# Patient Record
Sex: FEMALE | Race: WHITE | NOT HISPANIC OR LATINO | Employment: FULL TIME | URBAN - METROPOLITAN AREA
[De-identification: names, ages, dates, MRNs, and addresses within clinical notes are randomized per-mention and may not be internally consistent; named-entity substitution may affect disease eponyms.]

---

## 2023-09-02 PROBLEM — L50.3 DERMATOGRAPHISM: Status: ACTIVE | Noted: 2023-09-02

## 2023-09-02 PROBLEM — H72.01 CENTRAL PERFORATION OF TYMPANIC MEMBRANE OF RIGHT EAR: Status: ACTIVE | Noted: 2023-09-02

## 2023-09-02 PROBLEM — R60.0 EDEMA, LEG: Status: ACTIVE | Noted: 2023-09-02

## 2023-09-02 PROBLEM — O10.919 CHRONIC HYPERTENSION IN PREGNANCY (HHS-HCC): Status: ACTIVE | Noted: 2023-09-02

## 2023-09-02 PROBLEM — F41.9 ANXIETY: Status: ACTIVE | Noted: 2023-09-02

## 2023-09-02 PROBLEM — R53.82 CHRONIC FATIGUE: Status: ACTIVE | Noted: 2023-09-02

## 2023-09-02 PROBLEM — E66.1 DRUG-INDUCED OBESITY: Status: ACTIVE | Noted: 2023-09-02

## 2023-09-02 PROBLEM — O09.529 ELDERLY MULTIGRAVIDA WITH ANTEPARTUM CONDITION OR COMPLICATION (HHS-HCC): Status: ACTIVE | Noted: 2023-09-02

## 2023-09-02 PROBLEM — N97.9 FEMALE INFERTILITY: Status: ACTIVE | Noted: 2023-09-02

## 2023-09-02 PROBLEM — J30.9 ALLERGIC RHINITIS: Status: ACTIVE | Noted: 2023-09-02

## 2023-09-02 PROBLEM — E28.2 PCOS (POLYCYSTIC OVARIAN SYNDROME): Status: ACTIVE | Noted: 2023-09-02

## 2023-09-02 PROBLEM — O35.BXX0 ABNORMAL FETAL ECHOCARDIOGRAM AFFECTING ANTEPARTUM CARE OF MOTHER (HHS-HCC): Status: ACTIVE | Noted: 2023-09-02

## 2023-09-02 PROBLEM — O09.513 PRIMIGRAVIDA OF ADVANCED MATERNAL AGE IN THIRD TRIMESTER (HHS-HCC): Status: ACTIVE | Noted: 2023-09-02

## 2023-09-02 PROBLEM — R94.128 ABNORMAL TYMPANOGRAM: Status: ACTIVE | Noted: 2023-09-02

## 2023-09-02 PROBLEM — I10 BENIGN ESSENTIAL HYPERTENSION: Status: ACTIVE | Noted: 2023-09-02

## 2023-09-02 PROBLEM — H90.71 MIXED CONDUCTIVE AND SENSORINEURAL HEARING LOSS OF RIGHT EAR WITH UNRESTRICTED HEARING OF LEFT EAR: Status: ACTIVE | Noted: 2023-09-02

## 2023-09-02 PROBLEM — T78.2XXA ANAPHYLAXIS: Status: ACTIVE | Noted: 2023-09-02

## 2023-09-02 PROBLEM — Z86.39 HISTORY OF VITAMIN D DEFICIENCY: Status: ACTIVE | Noted: 2023-09-02

## 2023-09-02 PROBLEM — H91.90 HEARING LOSS: Status: ACTIVE | Noted: 2023-09-02

## 2023-09-02 PROBLEM — H65.04 RECURRENT ACUTE SEROUS OTITIS MEDIA OF RIGHT EAR: Status: ACTIVE | Noted: 2023-09-02

## 2023-09-02 PROBLEM — O09.43 HIGH RISK MULTIGRAVIDA IN THIRD TRIMESTER (HHS-HCC): Status: ACTIVE | Noted: 2023-09-02

## 2023-09-02 PROBLEM — O92.70 LACTATION DISORDER (HHS-HCC): Status: ACTIVE | Noted: 2023-09-02

## 2023-09-02 PROBLEM — Q37.9 CLEFT LIP AND PALATE (HHS-HCC): Status: ACTIVE | Noted: 2023-09-02

## 2023-09-02 RX ORDER — NORETHINDRONE 0.35 MG/1
1 TABLET ORAL DAILY
COMMUNITY
Start: 2022-12-15 | End: 2023-12-07 | Stop reason: ALTCHOICE

## 2023-09-02 RX ORDER — DOCUSATE SODIUM 100 MG/1
100 CAPSULE, LIQUID FILLED ORAL 2 TIMES DAILY PRN
COMMUNITY
Start: 2022-11-15 | End: 2023-12-07 | Stop reason: ALTCHOICE

## 2023-09-02 RX ORDER — ASPIRIN 81 MG/1
2 TABLET ORAL DAILY
COMMUNITY
Start: 2022-04-28 | End: 2023-12-07 | Stop reason: ALTCHOICE

## 2023-09-02 RX ORDER — DESOGESTREL AND ETHINYL ESTRADIOL 0.15-0.03
KIT ORAL EVERY 24 HOURS
COMMUNITY
Start: 2019-05-03 | End: 2024-03-01 | Stop reason: WASHOUT

## 2023-09-02 RX ORDER — LABETALOL 200 MG/1
1 TABLET, FILM COATED ORAL EVERY 12 HOURS
COMMUNITY
Start: 2020-02-19 | End: 2024-01-17 | Stop reason: SDUPTHER

## 2023-09-02 RX ORDER — GUAIFENESIN 1200 MG
975 TABLET, EXTENDED RELEASE 12 HR ORAL EVERY 6 HOURS PRN
COMMUNITY
Start: 2022-11-15 | End: 2023-12-07 | Stop reason: ALTCHOICE

## 2023-09-02 RX ORDER — IBUPROFEN 600 MG/1
600 TABLET ORAL EVERY 6 HOURS PRN
COMMUNITY
Start: 2022-11-15 | End: 2023-10-09

## 2023-09-02 RX ORDER — NYSTATIN 100000 U/G
1 CREAM TOPICAL 2 TIMES DAILY
COMMUNITY
Start: 2022-11-28 | End: 2023-12-07 | Stop reason: ALTCHOICE

## 2023-09-02 RX ORDER — POLYETHYLENE GLYCOL 3350 17 G/17G
1 POWDER, FOR SOLUTION ORAL DAILY
COMMUNITY
Start: 2023-01-03 | End: 2023-12-07 | Stop reason: ALTCHOICE

## 2023-10-05 ENCOUNTER — TELEMEDICINE (OUTPATIENT)
Dept: BEHAVIORAL HEALTH | Facility: CLINIC | Age: 38
End: 2023-10-05
Payer: COMMERCIAL

## 2023-10-05 DIAGNOSIS — F41.9 ANXIETY: ICD-10-CM

## 2023-10-05 PROCEDURE — 90837 PSYTX W PT 60 MINUTES: CPT | Performed by: PSYCHOLOGIST

## 2023-10-06 NOTE — PROGRESS NOTES
Behavioral Medicine Psychotherapy Progress Note      Name: Melanie Walsh  MRN: [id number]   YOB: 1985    Start Time: 1:05 pm   End Time: 2 pm   CPT Code: 81398 - 60 Min. with patient and/or family member        This is a follow-up virtual video visit.     Both the patient, and family and caregivers and guardians as appropriate, were informed of the current need to conduct treatment via virtual video visit. I have confirmed the patient's identity via the following (minimum of three) acceptable identifiers as per  Policy PH-9: name, birthdate, street address, and SSN.    Telephone/Televideo Informed Consent for Psychotherapy was reviewed with the patient as follows:  There are potential benefits and risks of the use of telephone or video-conferencing that differ from in-person sessions. Specifically, the telephone or televideo system we are using may not be HIPPA compliant and may present limits to patient confidentiality. Confidentiality still applies for telepsychology services, and nobody will record the session without your permission. You agree to use the telephone or video-conferencing platform selected for our virtual sessions, and I will explain how to use it.    1) You need to use a webcam or smartphone during the session.  2) It is important that you be in a quiet, private space that is free of distractions (including cell phone or other devices) during the session.  3) It is important to use a secure internet connection rather than public/free Wi-Fi.  4) It is important to be on time. If you need to cancel or change your tele-appointment, you must notify the psychologist in advance by phone or email.  5) We need a back-up plan (e.g., phone number where you can be reached) to restart the session or to reschedule it, in the event of technical problems.  6) We need a safety plan that includes at least one emergency contact and the closest emergency room to your location, in the event of a  crisis situation.  7) If you are not an adult, we need the permission of your parent or legal guardian (and their contact information) for you to participate in telepsychology sessions.    Understanding and verbal agreement was attested to by the patient.    Provider reviewed purpose of appointment and limits to confidentiality as indicated. Patient stated an understanding of the information and agreed to the session.       IDENTIFYING AND REFERRAL INFORMATION: VERONICA Walsh is a 37 yo able-bodied,  white female in postpartum period referred for treatment of anxiety symptoms.     SESSION: Today was the 11th appointment with this writer.       PRESENTING PROBLEM/SYMPTOMS/CHIEF COMPLAINT:   Patient said she has heightened anxiety postpartum since having her first child, in context of previous pregnancy loss and anxiety during the pregnancy. Worries were focused on potential for more health problems Worries can affect sleep, will go and check on her baby regularly before going to sleep. As she's gotten further into the postpartum period it is evident that her anxiety symptoms are more longstanding and present prior to her pregnancy.     Mental Status:  Orientation and consciousness: [x ]oriented X 3 and attentive     [ ]other, explain:   Appearance/grooming (factors observable via video):    [x ]appropriate [ ]poor grooming/hygiene bizarre appearance    [ ]other, explain:    Behavior: [x ]appropriate to context   [ ]inappropriate and/or bizarre, explain:    Speech: [x]normal rate/rhythm [ ]pressured speech []slow    [ ]other,    Language: [x ]normal    [ ]abnormalities noted, explain:    Mood: [ ]euthymic [ x]depressed [ ]dysphoric [ ]anxious    [ ]euphoric [ ] other   Affect: [x]mood congruent    []restricted   []incongruent, explain:    Evidence of perceptual disturbances (hallucinations, illusions, etc.):    [ x]no    [ ]yes, explain:   Thought processes:     [x ]normal and congruent     [ ]other, explain:     Insight: [ ]good [x ]adequate [ ]poor []questionable   Judgment: [ ]good [x ]adequate [ ]poor []questionable   Fund of Knowledge:[ ]above average [x ]average [ ]below average    Suicidal/Homicidal assessment: No lethality issues noted or observed. Patient denied suicidal or homicidal ideation, intent, or plan. Risk of harm low at this time. Has significant stressors and active mental health concerns, but several protective factors including help seeking, future orientation, reasons for living, responsibility to family, no substance abuse, and no history of self-harm or SI.      CONTENT:  Patient said she continues to feel increased stress and anxiety still, with all the steps of selling/buying houses and moving. They are in her parents' house and in the move in phase for their new house, which for now involves cleaning the house, painting, and doing minor repairs before living there full time. She said she's experienced a significant sense of calm and feeling of ownership that she did not experience with their first house; she knew that one first as her grandmother's house and no matter how much they updated it still felt like her grandmother's house. She likes feeling as if this is hers. She also had insight into her parents' lack of understanding about why she and her partner wanted this house; it is a fixer upper cosmetically which is not the phase of life her parents are at. Patient could understand why they are taking that perspective, which she noted is helpful insight. She also said it's been going “pretty well” staying at her parents' house. She said her father is starting to understand patient's daughter (his granddaughter) uses crying to communicate, not to annoy people. She said he's getting more patient with it. She also noticed communication with her dad has improved by them being around each other more and through her effort to address communication. She said she's realizing he's more receptive to new  information than she thought, particularly on certain topics like her parenting choices. She noted she did use deep breathing/controlled breathing, taking the dog for a walk to get a break and be outside have been helpful stress management tools. She finds these the most familiar and most accessible to use. “They've been very helpful.” The daytime routine is still upended, though, because of staying at her parents' and extra time in the day at the new house. She's noticed “even something as simple as showering” has been difficult because it's harder to prioritize the time. She is also aware there will be several adjustments once in their new house because it is (purposely) smaller than their previous house; this will impact her sewing/crafting space and she's mentally prepared and imagined options for sewing and making it mobile. She noted she would prefer to spend time on herself after her daughter goes to bed, but she's exhausted by then. She's by herself during the day with her daughter which makes it difficult to do activities any other time. Her mother will watch her daughter periodically, usually when she runs errands or goes to work (Thursdays).     She described familiarity with yoga nidra and interest in returning to practice, as another resource. She is hopeful to start going to a studio within walking distance of her new house; her BALBIR lives close by and she is also hoping they hold each other accountable because she wants to start going to yoga again as well. Discussed potential for doing so and strategies for setting realistic expectations. Also spent time during session normalizing her experience in that some situations are more challenging and can prompt more unwanted emotions and more uncomfortable situations; emphasized that in these circumstances regular use of stress management tools is important. Also discussed experiences with approaching/acting on situations that are anxiety-provoking and how  "it can over time help in learning to manage the anxiety. She felt like this was consistent with her experience with her dad; she was skeptical they could improve the dynamic in their relationship but she feels like the time together in the same house has helped them do just that.     Will explore what makes it difficult to prioritize her personal time. 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 Financially it was also the only thing feasible for them. She does not think she feels guilty about stepping away from her daughter to do her own thing, but wants to explore how she feels in making decisions for herself. She assumes guilt will show up in the future as she does try to do more things for herself or on her own and she wants to understand this more. Provider suggested exploring how she has felt so far in these situations and what contributes to her decisions, to see if we can gain insight into where the barriers lie.       FROM PREVIOUS SESSION:   Patient also mentioned she sees her father's disagreement with her as a negative, that she \"shouldn't upset him. I'm not supposed to upset other people especially my parents.\" Provider introduced tyranny of the shoulds and noted that should statements are an expression of personal expectations. Suggested when she notices a should statement she can reflect on the expectation she expressed to decide if it is still realistic for her. She said that would be \"very empowering\" to realize she can create her own experience, that she has the power to change these things. Provider acknowledged it can be challenging and she may not know how she wants to change her expectations, but she can start with identifying which expectations she wants to change. Provider also noted that she seems to have self-critical automatic thoughts about her father's negative reactions to her and maybe her expectations are prompting that self-criticism, since she expects " herself to only please others not make them angry/disappointed. She agreed.       THERAPEUTIC INTERVENTION:  [ x] Psychosocial  [ ] Treatment Goals  [ x] Cognitive/Behavioral   [ x] Psychoeducation   [ ] Insight/Psychodynamic  [ ] Problem solving  [ x] Supportive  [ ] Referral to additional/other treatment/resources    Patient Discussion/Summary  rt for individual psychotherapy with this psychologist via video telehealth. Patient agreed to appointment frequency and plan. Patient has scheduling contact info and plans to call for more appointments.

## 2023-10-09 ENCOUNTER — OFFICE VISIT (OUTPATIENT)
Dept: GASTROENTEROLOGY | Facility: CLINIC | Age: 38
End: 2023-10-09
Payer: COMMERCIAL

## 2023-10-09 VITALS — BODY MASS INDEX: 44.26 KG/M2 | HEIGHT: 67 IN | WEIGHT: 282 LBS | HEART RATE: 84 BPM

## 2023-10-09 DIAGNOSIS — Q37.9 CLEFT LIP AND PALATE (HHS-HCC): ICD-10-CM

## 2023-10-09 DIAGNOSIS — R10.13 EPIGASTRIC PAIN: ICD-10-CM

## 2023-10-09 DIAGNOSIS — K21.9 GASTROESOPHAGEAL REFLUX DISEASE, UNSPECIFIED WHETHER ESOPHAGITIS PRESENT: Primary | ICD-10-CM

## 2023-10-09 DIAGNOSIS — R11.11 VOMITING WITHOUT NAUSEA, UNSPECIFIED VOMITING TYPE: ICD-10-CM

## 2023-10-09 PROCEDURE — 99204 OFFICE O/P NEW MOD 45 MIN: CPT

## 2023-10-09 RX ORDER — PANTOPRAZOLE SODIUM 40 MG/1
40 TABLET, DELAYED RELEASE ORAL DAILY
Qty: 30 TABLET | Refills: 2 | Status: SHIPPED | OUTPATIENT
Start: 2023-10-09 | End: 2023-12-07 | Stop reason: SDUPTHER

## 2023-10-09 ASSESSMENT — ENCOUNTER SYMPTOMS
VOMITING: 0
DEPRESSION: 0
ANAL BLEEDING: 0
FATIGUE: 0
LOSS OF SENSATION IN FEET: 0
SHORTNESS OF BREATH: 0
COUGH: 0
BLOOD IN STOOL: 0
ABDOMINAL DISTENTION: 0
TROUBLE SWALLOWING: 0
RECTAL PAIN: 0
FEVER: 0
APPETITE CHANGE: 0
ABDOMINAL PAIN: 0
CONSTIPATION: 0
DIARRHEA: 0
CHILLS: 0
NAUSEA: 0
OCCASIONAL FEELINGS OF UNSTEADINESS: 0

## 2023-10-09 NOTE — PROGRESS NOTES
Subjective     History of Present Illness:   Melanie Walsh is a 38 y.o. female with PMHx of anxiety, HTN, PCOS who presents to GI clinic for further evaluation severe epigastric pain    Today, patient states states when she has an allergic reaction for the past 2 yrs, she experiences intense gut pain and heartburn.  So severe, she vomits.  Unidentified reaction at this time.  Pepcid in the ED helped.  Epigastric pain lasts a few days, swelling in throat, wheezing, and congestion after anaphylactic reaction. Hasn't tried any medications regularly.  Patient states she is currently breast-feeding.  Is following with allergist to work on identifiable cause, but seems to have related to seasonal or environmental allergies.  Recently moved from 100-year-old house to new her house, which she has not experienced symptoms while living in.  Denies NSAID use.  Denies constipation, diarrhea, melena, hematochezia, dysphagia, unintentional weight loss    Denies ETOH, smoking, marijuana  Denies fxh GI cancer or IBD  Abdominal Surgeries: , cough palate repair    Denies history of colonoscopy or EGD      Past Medical History  As per HPI.     Social History  As per HPI reports that she has never smoked. She has never used smokeless tobacco. She reports that she does not currently use alcohol. She reports that she does not use drugs.     Family History  her family history includes Diabetes in her father and mother; Osteoarthritis in her mother.     Review of Systems  Review of Systems   Constitutional:  Negative for appetite change, chills, fatigue and fever.   HENT:  Negative for trouble swallowing.    Respiratory:  Negative for cough and shortness of breath.    Gastrointestinal:  Negative for abdominal distention, abdominal pain, anal bleeding, blood in stool, constipation, diarrhea, nausea, rectal pain and vomiting.       Allergies  Allergies   Allergen Reactions    Seasonale (91) [Levonorgestrel-Ethinyl Estrad]  Anaphylaxis    Penicillin V Hives       Medications  Current Outpatient Medications   Medication Instructions    acetaminophen (TYLENOL) 975 mg, oral, Every 6 hours PRN    aspirin 81 mg EC tablet 2 tablets, oral, Daily    desogestreL-ethinyl estradioL (Apri) 0.15-0.03 mg tablet Every 24 hours    docusate sodium (COLACE) 100 mg, oral, 2 times daily PRN    labetalol (Normodyne) 200 mg tablet 1 tablet, oral, Every 12 hours    loratadine-pseudoephedrine (Claritin-D 24-hour)  mg 24 hr tablet 1 tablet, oral, Daily, Do not crush, chew, or split.    norethindrone (Lyleq) 0.35 mg tablet 1 tablet, oral, Daily    nystatin (Mycostatin) cream 1 Film, Topical, 2 times daily    pantoprazole (PROTONIX) 40 mg, oral, Daily, Do not crush, chew, or split. Take 30 minutes before a meal    -iron fum-folic acid 29 mg iron- 1 mg tablet oral    polyethylene glycol (Glycolax, Miralax) 17 gram/dose powder 1 packet, oral, Daily,  MIX 1 PACKET IN 8 OUNCES OF LIQUID AND DRINK ONCE DAILY.<BR>        Objective   Visit Vitals  Pulse 84      Physical Exam  Constitutional:       Appearance: Normal appearance. She is normal weight.   HENT:      Mouth/Throat:      Mouth: Mucous membranes are dry.      Pharynx: Oropharynx is clear.   Cardiovascular:      Rate and Rhythm: Normal rate and regular rhythm.   Pulmonary:      Effort: Pulmonary effort is normal.      Breath sounds: Normal breath sounds. No wheezing or rhonchi.   Abdominal:      General: Abdomen is flat. Bowel sounds are normal. There is no distension.      Palpations: Abdomen is soft. There is no hepatomegaly.      Tenderness: There is no abdominal tenderness. There is no guarding or rebound. Negative signs include Crystal's sign.      Hernia: No hernia is present.   Musculoskeletal:         General: Normal range of motion.   Skin:     General: Skin is warm and dry.   Neurological:      General: No focal deficit present.      Mental Status: She is alert and oriented to person,  place, and time.   Psychiatric:         Mood and Affect: Mood normal.         Behavior: Behavior normal.           Lab Results   Component Value Date    WBC 7.7 01/03/2023    WBC 10.7 11/13/2022    WBC 16.8 (H) 11/12/2022    HGB 13.8 01/03/2023    HGB 9.2 (L) 11/13/2022    HGB 10.8 (L) 11/12/2022    HCT 43.4 01/03/2023    HCT 28.2 (L) 11/13/2022    HCT 34.5 (L) 11/12/2022     (H) 01/03/2023     11/13/2022     11/12/2022     Lab Results   Component Value Date     01/03/2023     04/28/2022     03/16/2022    K 4.7 01/03/2023    K 4.1 04/28/2022    K 3.8 03/16/2022     01/03/2023     04/28/2022     03/16/2022    CO2 26 01/03/2023    CO2 23 04/28/2022    CO2 22 03/16/2022    BUN 17 01/03/2023    BUN 9 04/28/2022    BUN 18 03/16/2022    CREATININE 0.73 01/03/2023    CREATININE 0.49 (L) 04/28/2022    CREATININE 0.58 03/16/2022    CALCIUM 9.9 01/03/2023    CALCIUM 9.9 04/28/2022    CALCIUM 9.6 03/16/2022    PROT 6.7 01/03/2023    PROT 6.5 04/28/2022    PROT 7.1 03/16/2022    BILITOT 0.4 01/03/2023    BILITOT 0.4 04/28/2022    BILITOT 0.4 03/16/2022    ALKPHOS 104 01/03/2023    ALKPHOS 56 04/28/2022    ALKPHOS 77 03/16/2022    ALT 28 01/03/2023    ALT 21 04/28/2022    ALT 17 03/16/2022    AST 17 01/03/2023    AST 14 04/28/2022    AST 14 03/16/2022    GLUCOSE 86 01/03/2023    GLUCOSE 85 04/28/2022    GLUCOSE 80 03/16/2022           Melanie Walsh is a 38 y.o. female who presents to GI clinic for GERD which seems to relate to allergic anaphylactic reactions of an identifiable cause.  Consider gastritis, EOE, esophagitis, duodenitis, PUD  -Schedule EGD  -Start Protonix 40 mg daily  -Continue to follow with allergist  -Follow-up 3 weeks postprocedure           GISELA Gilman-CNP    Time Spent  Time spent directly with patient, family or caregiver: 55 minutes  Documentation Time: 15 minutes

## 2023-10-09 NOTE — PATIENT INSTRUCTIONS
Please take Protonix 40 mg daily.  Please schedule your upper endoscopy.  Plan to have a ride for this procedure since it involves sedation.  Please do not eat or drink anything after midnight the night prior to this procedure.   Please follow up 3 weeks post procedure

## 2023-10-20 ENCOUNTER — TELEMEDICINE (OUTPATIENT)
Dept: BEHAVIORAL HEALTH | Facility: CLINIC | Age: 38
End: 2023-10-20
Payer: COMMERCIAL

## 2023-10-20 DIAGNOSIS — F41.9 ANXIETY: Primary | ICD-10-CM

## 2023-10-20 PROCEDURE — 90837 PSYTX W PT 60 MINUTES: CPT | Mod: 95 | Performed by: PSYCHOLOGIST

## 2023-10-20 NOTE — PROGRESS NOTES
Behavioral Medicine Psychotherapy Progress Note      Name: Melanie Walsh    Start Time: 1 pm   End Time: 1:56 pm   Time with patient: 56 minutes    SESSION: Today was the 12th appointment with this writer.     This is a follow-up virtual video visit.     Both the patient, and family and caregivers and guardians as appropriate, were informed of the current need to conduct treatment via virtual video visit. I have confirmed the patient's identity via the following (minimum of three) acceptable identifiers as per  Policy PH-9: name, birthdate, street address, and SSN.    Telephone/Televideo Informed Consent for Psychotherapy was reviewed with the patient as follows:  There are potential benefits and risks of the use of telephone or video-conferencing that differ from in-person sessions. Specifically, the telephone or televideo system we are using may not be HIPPA compliant and may present limits to patient confidentiality. Confidentiality still applies for telepsychology services, and nobody will record the session without your permission. You agree to use the telephone or video-conferencing platform selected for our virtual sessions, and I will explain how to use it.    1) You need to use a webcam or smartphone during the session.  2) It is important that you be in a quiet, private space that is free of distractions (including cell phone or other devices) during the session.  3) It is important to use a secure internet connection rather than public/free Wi-Fi.  4) It is important to be on time. If you need to cancel or change your tele-appointment, you must notify the psychologist in advance by phone or email.  5) We need a back-up plan (e.g., phone number where you can be reached) to restart the session or to reschedule it, in the event of technical problems.  6) We need a safety plan that includes at least one emergency contact and the closest emergency room to your location, in the event of a crisis  situation.  7) If you are not an adult, we need the permission of your parent or legal guardian (and their contact information) for you to participate in telepsychology sessions.    Understanding and verbal agreement was attested to by the patient.    Provider reviewed purpose of appointment and limits to confidentiality as indicated. Patient stated an understanding of the information and agreed to the session.       IDENTIFYING INFORMATION: VERONICA Walsh is a 37 yo able-bodied,  white female in postpartum period referred for treatment of anxiety symptoms.       PRESENTING PROBLEM/SYMPTOMS/CHIEF COMPLAINT:   Patient said she has heightened anxiety postpartum since having her first child, in context of previous pregnancy loss and anxiety during the pregnancy. Worries were focused on potential for more health problems. As she's gotten further into the postpartum period it is evident that her anxiety symptoms are more longstanding and present prior to her pregnancy. Addressing postpartum anxiety and general anxiety concerns.     Mental Status:  Orientation and consciousness: [x ]oriented X 3 and attentive     [ ]other, explain:   Appearance/grooming (factors observable via video):    [x ]appropriate [ ]poor grooming/hygiene bizarre appearance    [ ]other, explain:    Behavior: [x ]appropriate to context   [ ]inappropriate and/or bizarre, explain:    Speech: [x]normal rate/rhythm [ ]pressured speech []slow    [ ]other,    Language: [x ]normal    [ ]abnormalities noted, explain:    Mood: [ x]euthymic [ ]depressed [ ]dysphoric [ ]anxious    [ ]euphoric [ ] other   Affect: [x]mood congruent    []restricted   []incongruent, explain:    Evidence of perceptual disturbances (hallucinations, illusions, etc.):    [ x]no    [ ]yes, explain:   Thought processes:     [x ]normal and congruent     [ ]other, explain:    Insight: [ ]good [x ]adequate [ ]poor []questionable   Judgment: [ ]good [x ]adequate [ ]poor []questionable    "Fund of Knowledge:[ ]above average [x ]average [ ]below average    Suicidal/Homicidal assessment: No lethality issues noted or observed. Patient denied suicidal or homicidal ideation, intent, or plan. Risk of harm low at this time.       CONTENT:  Patient said she continues to feel increased stress and anxiety still, with all the steps of moving. Their move-in date got pushed back because the updates/cleaning for their new house is taking longer than anticipated. Patient said they are ready to move because their space in her parents' house is feeling confining. Patient recognized the unwanted elements of the situation as well as what she appreciates (\"at least we have somewhere to stay that's free and flexible\"). She indicated it is helpful to choose to focus on what she appreciates instead of what's not going well.     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 This is driven, in part, by figuring out how to include personal time in the routine/schedule. She noted she would prefer to spend time on herself after her daughter goes to bed, but she's exhausted by then. If she stays up she is in bed by 9:45. She's by herself during the day and her daughter rarely naps. Her mother will watch her daughter periodically, but she does not want to feel like her mother is pressured to watch her daughter frequently. She does not think she feels guilty about stepping away from her daughter to do her own thing, but wants to explore how she feels in making decisions for herself. She assumes guilt will show up in the future as she does try to do more things for herself or on her own and she wants to understand this more.     Stress management tools: deep breathing particularly when on a walk, being outside in nature. Hoping in future to go to yoga class with her BALBIR at a neighborhood studio.     FROM PREVIOUS SESSION:   Patient also " "mentioned she sees her father's disagreement with her as a negative, that she \"shouldn't upset him. I'm not supposed to upset other people especially my parents.\" She said that would be \"very empowering\" to realize she can create her own experience, that she has the power to change these things. Provider also noted that she seems to have self-critical automatic thoughts about her father's negative reactions to her and maybe her expectations are prompting that self-criticism, since she expects herself to only please others not make them angry/disappointed. She agreed.   She said she's experienced a significant sense of calm and feeling of ownership that she did not experience with their first house. She likes feeling as if this is hers. She also said it's been going “pretty well” staying at her parents' house. She noticed communication with her dad has improved by them being around each other more and through her effort to address communication. She said she's realizing he's more receptive to new information than she thought, particularly on certain topics like her parenting choices.       THERAPEUTIC INTERVENTION:  [ x] Psychosocial  [ ] Treatment Goals  [ x] Cognitive/Behavioral   [ x] Psychoeducation   [ ] Insight/Psychodynamic  [ ] Problem solving  [ x] Supportive  [ ] Referral to additional/other treatment/resources    Plan:   rtc for individual psychotherapy with this psychologist via video telehealth. Patient agreed to appointment frequency and plan. Patient has scheduling contact info and plans to call for more appointments.  "

## 2023-10-24 ENCOUNTER — APPOINTMENT (OUTPATIENT)
Dept: OTOLARYNGOLOGY | Facility: CLINIC | Age: 38
End: 2023-10-24
Payer: COMMERCIAL

## 2023-11-02 ENCOUNTER — APPOINTMENT (OUTPATIENT)
Dept: BEHAVIORAL HEALTH | Facility: CLINIC | Age: 38
End: 2023-11-02
Payer: COMMERCIAL

## 2023-11-17 ENCOUNTER — OFFICE VISIT (OUTPATIENT)
Dept: GASTROENTEROLOGY | Facility: EXTERNAL LOCATION | Age: 38
End: 2023-11-17
Payer: COMMERCIAL

## 2023-11-17 DIAGNOSIS — K31.89 GASTROPTOSIS: ICD-10-CM

## 2023-11-17 DIAGNOSIS — K21.9 GASTROESOPHAGEAL REFLUX DISEASE, UNSPECIFIED WHETHER ESOPHAGITIS PRESENT: ICD-10-CM

## 2023-11-17 DIAGNOSIS — R11.2 NAUSEA AND VOMITING, UNSPECIFIED VOMITING TYPE: Primary | ICD-10-CM

## 2023-11-17 PROCEDURE — 88305 TISSUE EXAM BY PATHOLOGIST: CPT | Performed by: PATHOLOGY

## 2023-11-17 PROCEDURE — 43239 EGD BIOPSY SINGLE/MULTIPLE: CPT | Performed by: INTERNAL MEDICINE

## 2023-11-17 PROCEDURE — 88305 TISSUE EXAM BY PATHOLOGIST: CPT

## 2023-11-20 ENCOUNTER — LAB REQUISITION (OUTPATIENT)
Dept: LAB | Facility: HOSPITAL | Age: 38
End: 2023-11-20
Payer: COMMERCIAL

## 2023-12-04 LAB
LABORATORY COMMENT REPORT: NORMAL
PATH REPORT.FINAL DX SPEC: NORMAL
PATH REPORT.GROSS SPEC: NORMAL
PATH REPORT.RELEVANT HX SPEC: NORMAL
PATH REPORT.TOTAL CANCER: NORMAL

## 2023-12-07 ENCOUNTER — OFFICE VISIT (OUTPATIENT)
Dept: GASTROENTEROLOGY | Facility: CLINIC | Age: 38
End: 2023-12-07
Payer: COMMERCIAL

## 2023-12-07 VITALS — BODY MASS INDEX: 43 KG/M2 | WEIGHT: 274 LBS | HEIGHT: 67 IN | OXYGEN SATURATION: 98 % | HEART RATE: 80 BPM

## 2023-12-07 DIAGNOSIS — K21.9 GASTROESOPHAGEAL REFLUX DISEASE, UNSPECIFIED WHETHER ESOPHAGITIS PRESENT: ICD-10-CM

## 2023-12-07 PROCEDURE — 1036F TOBACCO NON-USER: CPT

## 2023-12-07 PROCEDURE — 99213 OFFICE O/P EST LOW 20 MIN: CPT

## 2023-12-07 RX ORDER — PANTOPRAZOLE SODIUM 40 MG/1
40 TABLET, DELAYED RELEASE ORAL DAILY
Qty: 30 TABLET | Refills: 4 | Status: SHIPPED | OUTPATIENT
Start: 2023-12-07 | End: 2024-05-13

## 2023-12-07 ASSESSMENT — ENCOUNTER SYMPTOMS
CONSTIPATION: 0
FEVER: 0
CHILLS: 0
SHORTNESS OF BREATH: 0
BLOOD IN STOOL: 0
FATIGUE: 0
NAUSEA: 0
TROUBLE SWALLOWING: 0
DIARRHEA: 0
ABDOMINAL DISTENTION: 0
COUGH: 0
VOMITING: 0
ANAL BLEEDING: 0
ABDOMINAL PAIN: 0
RECTAL PAIN: 0
APPETITE CHANGE: 0

## 2023-12-07 NOTE — PROGRESS NOTES
Subjective     History of Present Illness:   Melanie Walsh is a 38 y.o. female with PMHx of anxiety, HTN, PCOS   who presents to GI clinic for follow up.  Last seen around 10/2023 for GERD and patient complains of allergies.  EGD ordered, Protonix.    Today, patient states she is doing much better on 40 mg Protonix.  Sleeps on a wedge pillow, which helps.  Had a couple flareups of acid reflux around Thanksgiving when eating food she normally does not eat, other than that is controlled.  Denies any other GI complaints today    Denies ETOH, smoking, marijuana  Denies fxh GI cancer or IBD  Abdominal Surgeries: , cough palate repair     Denies history of colonoscopy  Last EGD 2023 Dr. Guillaume: Grade B reflux esophagitis, bilious gastric fluid, erythematous mucosa gastric body and antrum.  Pathology benign, negative EOE  Review of Systems  Review of Systems   Constitutional:  Negative for appetite change, chills, fatigue and fever.   HENT:  Negative for trouble swallowing.    Respiratory:  Negative for cough and shortness of breath.    Gastrointestinal:  Negative for abdominal distention, abdominal pain, anal bleeding, blood in stool, constipation, diarrhea, nausea, rectal pain and vomiting.       Allergies  Allergies   Allergen Reactions    Cayenne Hives    Penicillin V Hives       Medications  Current Outpatient Medications   Medication Instructions    cetirizine (ZYRTEC) 10 mg capsule oral, Daily    cholecalciferol, vitamin D3, (VITAMIN D3 ORAL) oral    desogestreL-ethinyl estradioL (Apri) 0.15-0.03 mg tablet Every 24 hours    EPINEPHrine (EPIPEN) 0.3 mg, intramuscular    EPINEPHrine (EPIPEN) 0.3 mg, intramuscular, As needed, Inject into UPPER, OUTER THIGH. Call 911 after use.    labetalol (NORMODYNE) 200 mg, oral, Every 12 hours    pantoprazole (PROTONIX) 40 mg, oral, Daily, Do not crush, chew, or split. Take 30 minutes before a meal    prenatal no115/iron/folic acid (PRENATAL 19 ORAL) oral         Objective   Visit Vitals  Pulse 80      Physical Exam      Lab Results   Component Value Date    WBC 7.7 01/03/2023    WBC 10.7 11/13/2022    WBC 16.8 (H) 11/12/2022    HGB 13.8 01/03/2023    HGB 9.2 (L) 11/13/2022    HGB 10.8 (L) 11/12/2022    HCT 43.4 01/03/2023    HCT 28.2 (L) 11/13/2022    HCT 34.5 (L) 11/12/2022     (H) 01/03/2023     11/13/2022     11/12/2022     Lab Results   Component Value Date     01/03/2023     04/28/2022     03/16/2022    K 4.7 01/03/2023    K 4.1 04/28/2022    K 3.8 03/16/2022     01/03/2023     04/28/2022     03/16/2022    CO2 26 01/03/2023    CO2 23 04/28/2022    CO2 22 03/16/2022    BUN 17 01/03/2023    BUN 9 04/28/2022    BUN 18 03/16/2022    CREATININE 0.73 01/03/2023    CREATININE 0.49 (L) 04/28/2022    CREATININE 0.58 03/16/2022    CALCIUM 9.9 01/03/2023    CALCIUM 9.9 04/28/2022    CALCIUM 9.6 03/16/2022    PROT 6.7 01/03/2023    PROT 6.5 04/28/2022    PROT 7.1 03/16/2022    BILITOT 0.4 01/03/2023    BILITOT 0.4 04/28/2022    BILITOT 0.4 03/16/2022    ALKPHOS 104 01/03/2023    ALKPHOS 56 04/28/2022    ALKPHOS 77 03/16/2022    ALT 28 01/03/2023    ALT 21 04/28/2022    ALT 17 03/16/2022    AST 17 01/03/2023    AST 14 04/28/2022    AST 14 03/16/2022    GLUCOSE 86 01/03/2023    GLUCOSE 85 04/28/2022    GLUCOSE 80 03/16/2022           Mleanie Walsh is a 38 y.o. female who presents to GI clinic for GERD.    Gastroesophageal reflux disease  - continue 40 mg protonix daily  - consider decrease dosage in 3-4 months       Brenda Lius, GISELA-CNP

## 2023-12-07 NOTE — PATIENT INSTRUCTIONS
Try to minimize acidic foods such as citrus fruits, tomatoes, and pop. Also try to avoid chocolate, peppermint, alcohol, and caffeine.  Avoid eating within 2-3 hours of lying down.   Eat more frequent smaller meals instead of a few large meals.  You can prop the head of your bed up when you are sleeping to decrease reflux.  Please continue protonix 40 mg 30-60 minutes before a meal daily.  Contact me in 3-4 months and let me know if you'd like to attempt to decrease the dosage

## 2023-12-13 ENCOUNTER — OFFICE VISIT (OUTPATIENT)
Dept: ALLERGY | Facility: CLINIC | Age: 38
End: 2023-12-13
Payer: COMMERCIAL

## 2023-12-13 VITALS
WEIGHT: 252.38 LBS | HEART RATE: 85 BPM | HEIGHT: 67 IN | BODY MASS INDEX: 39.61 KG/M2 | SYSTOLIC BLOOD PRESSURE: 117 MMHG | DIASTOLIC BLOOD PRESSURE: 66 MMHG

## 2023-12-13 DIAGNOSIS — T78.2XXA ANAPHYLAXIS, INITIAL ENCOUNTER: ICD-10-CM

## 2023-12-13 DIAGNOSIS — J30.89 NON-SEASONAL ALLERGIC RHINITIS DUE TO OTHER ALLERGIC TRIGGER: Primary | ICD-10-CM

## 2023-12-13 DIAGNOSIS — K21.00 GASTROESOPHAGEAL REFLUX DISEASE WITH ESOPHAGITIS WITHOUT HEMORRHAGE: ICD-10-CM

## 2023-12-13 PROCEDURE — 1036F TOBACCO NON-USER: CPT | Performed by: ALLERGY & IMMUNOLOGY

## 2023-12-13 PROCEDURE — 3074F SYST BP LT 130 MM HG: CPT | Performed by: ALLERGY & IMMUNOLOGY

## 2023-12-13 PROCEDURE — 3078F DIAST BP <80 MM HG: CPT | Performed by: ALLERGY & IMMUNOLOGY

## 2023-12-13 PROCEDURE — 99214 OFFICE O/P EST MOD 30 MIN: CPT | Performed by: ALLERGY & IMMUNOLOGY

## 2023-12-13 RX ORDER — FERROUS SULFATE TAB 325 MG (65 MG ELEMENTAL FE) 325 (65 FE) MG
1 TAB ORAL EVERY OTHER DAY
COMMUNITY
Start: 2022-12-24 | End: 2024-01-30 | Stop reason: WASHOUT

## 2023-12-13 RX ORDER — EPINEPHRINE 0.3 MG/.3ML
0.3 INJECTION SUBCUTANEOUS AS NEEDED
Qty: 2 EACH | Refills: 3 | Status: SHIPPED | OUTPATIENT
Start: 2023-12-13

## 2023-12-13 RX ORDER — EPINEPHRINE 0.3 MG/.3ML
0.3 INJECTION SUBCUTANEOUS
COMMUNITY
Start: 2022-12-08

## 2023-12-13 ASSESSMENT — PAIN SCALES - GENERAL: PAINLEVEL: 0-NO PAIN

## 2023-12-13 NOTE — PROGRESS NOTES
"Subjective   Patient ID:   [id number]   Melanie Walsh is a 38 y.o. female who presents for Allergies (3 month fuv).    Chief Complaint   Patient presents with    Allergies     3 month fuv          HPI  This patient is here to evaluate for:    Allergies are going well.  Here with her 13 mo daughter    Had a reaction on thanksgiving.   Pain in post throat by uvula  No dysphagia or swelling.     Benadryl helped w/in an hour.   ?mold or ragweed  -     GI - has seen GI and had EGD. Dx with gerd   Put on protonix.   We had put her on pepsid so she doesn't need that now     We reviewed her allergy tests from 1/27/23  Has mold, dust mites and pollen allergy    She takes zyrtec.  Of note, she is breastfeeding.   Has an epipen but has never needed it.   The benadryl has always taken care of it.   Got it from the ED.     Pmhx: s/p cleft lip and palate - 18/19 surgeries total.     Review of Systems   All other systems reviewed and are negative.        Objective     /66 (BP Location: Right arm, Patient Position: Sitting, BP Cuff Size: Large adult)   Pulse 85   Ht 1.702 m (5' 7\")   Wt 114 kg (252 lb 6 oz)   BMI 39.53 kg/m²      Physical Exam  Vitals and nursing note reviewed.   Constitutional:       Appearance: Normal appearance. She is normal weight.   HENT:      Head: Normocephalic and atraumatic.      Right Ear: External ear normal.      Left Ear: External ear normal.      Nose: No septal deviation, congestion or rhinorrhea.      Right Turbinates: Not enlarged, swollen or pale.      Left Turbinates: Not enlarged, swollen or pale.      Mouth/Throat:      Mouth: Mucous membranes are moist.      Comments: S/p surgical reconstruction with asymmetry posterior pharynx and lateral walls;  absent tonsils, no post nasal drainage   Eyes:      Extraocular Movements: Extraocular movements intact.      Conjunctiva/sclera: Conjunctivae normal.      Pupils: Pupils are equal, round, and reactive to light.   Cardiovascular:      " Rate and Rhythm: Normal rate and regular rhythm.      Pulses: Normal pulses.      Heart sounds: Normal heart sounds.   Pulmonary:      Effort: Pulmonary effort is normal.      Breath sounds: Normal breath sounds. No wheezing, rhonchi or rales.   Musculoskeletal:         General: Normal range of motion.   Skin:     General: Skin is warm and dry.      Findings: No erythema or rash.   Neurological:      General: No focal deficit present.      Mental Status: She is alert and oriented to person, place, and time.   Psychiatric:         Mood and Affect: Mood normal.         Behavior: Behavior normal.            Current Outpatient Medications   Medication Sig Dispense Refill    EPINEPHrine 0.3 mg/0.3 mL injection syringe Inject 0.3 mL (0.3 mg) into the muscle.      FeroSuL tablet Take 1 tablet by mouth every other day.      desogestreL-ethinyl estradioL (Apri) 0.15-0.03 mg tablet once every 24 hours.      labetalol (Normodyne) 200 mg tablet Take 1 tablet (200 mg) by mouth every 12 hours.      loratadine-pseudoephedrine (Claritin-D 24-hour)  mg 24 hr tablet Take 1 tablet by mouth once daily. Do not crush, chew, or split.      pantoprazole (Protonix) 40 mg EC tablet Take 1 tablet (40 mg) by mouth once daily. Do not crush, chew, or split. Take 30 minutes before a meal 30 tablet 4     No current facility-administered medications for this visit.       Lab Requisition on 11/17/2023   Component Date Value Ref Range Status    Case Report 11/17/2023    Final                    Value:Surgical Pathology                                Case: [id number]                                  Authorizing Provider:  Jackelin Guillaume MD        Collected:           11/17/2023 1259              Ordering Location:     [facility]       Received:            11/20/2023 1519                                     Center                                                                       Pathologist:           Candice Marcano MD                                                             Specimens:   A) - DUODENAL BULB  BIOPSY, DUODENUM,DUODENAL BULB,SECOND PART,COLD FORCEP,BIOPSY                   B) - STOMACH ANTRUM BIOPSY, STOMACH,BODY,ANTRUM,COLD FORCEP,BIOPSY                                  C) - ESOPHAGUS MID BIOPSY, ESOPHAGUS,MIDDLE THIRD,COLD FORCEP,BIOPSY                       FINAL DIAGNOSIS 11/17/2023    Final                    Value:This result contains rich text formatting which cannot be displayed here.      11/17/2023    Final                    Value:This result contains rich text formatting which cannot be displayed here.    Clinical History 11/17/2023    Final                    Value:This result contains rich text formatting which cannot be displayed here.    Gross Description 11/17/2023    Final                    Value:This result contains rich text formatting which cannot be displayed here.         Assessment/Plan   Diagnoses and all orders for this visit:  Non-seasonal allergic rhinitis due to other allergic trigger  Anaphylaxis, initial encounter  -     EPINEPHrine (Epipen) 0.3 mg/0.3 mL injection syringe; Inject 0.3 mL (0.3 mg) into the muscle if needed for anaphylaxis. Inject into UPPER, OUTER THIGH. Call 911 after use.  Gastroesophageal reflux disease with esophagitis without hemorrhage      Glad to hear that you have been doing well!    Will renew the epipen out of caution. It is unlikely that you will need it since you don't have food allergy.  The issue is probably a combination of allergy, GERD, and your hx of prior cleft palate surgeries.       Efren Santos MD

## 2023-12-28 NOTE — RESULT ENCOUNTER NOTE
The biopsies from your esophagus, stomach and small intestine are normal with no evidence of inflammation or infection.     Jackelin Guillaume

## 2024-01-10 DIAGNOSIS — I10 BENIGN ESSENTIAL HYPERTENSION: ICD-10-CM

## 2024-01-10 NOTE — TELEPHONE ENCOUNTER
Patient was last seen 1/3/2023. Next appointment is 2/16/24. Rx refill.  labetalol (Normodyne) 200 mg tablet   [id number] [place], [place]   Phone: [phone]   Fax: [phone]   2 days worth of pills left.

## 2024-01-17 RX ORDER — LABETALOL 200 MG/1
1 TABLET, FILM COATED ORAL EVERY 12 HOURS
Qty: 90 TABLET | Refills: 2 | Status: SHIPPED | OUTPATIENT
Start: 2024-01-17 | End: 2024-05-02 | Stop reason: SDUPTHER

## 2024-01-30 ENCOUNTER — OFFICE VISIT (OUTPATIENT)
Dept: OTOLARYNGOLOGY | Facility: CLINIC | Age: 39
End: 2024-01-30
Payer: COMMERCIAL

## 2024-01-30 ENCOUNTER — CLINICAL SUPPORT (OUTPATIENT)
Dept: AUDIOLOGY | Facility: CLINIC | Age: 39
End: 2024-01-30
Payer: COMMERCIAL

## 2024-01-30 VITALS — TEMPERATURE: 97.5 F | WEIGHT: 277.6 LBS | BODY MASS INDEX: 43.48 KG/M2

## 2024-01-30 DIAGNOSIS — H90.71 MIXED CONDUCTIVE AND SENSORINEURAL HEARING LOSS OF RIGHT EAR WITH UNRESTRICTED HEARING OF LEFT EAR: Primary | ICD-10-CM

## 2024-01-30 DIAGNOSIS — H72.01 CENTRAL PERFORATION OF TYMPANIC MEMBRANE OF RIGHT EAR: Primary | ICD-10-CM

## 2024-01-30 PROCEDURE — 92550 TYMPANOMETRY & REFLEX THRESH: CPT | Performed by: AUDIOLOGIST

## 2024-01-30 PROCEDURE — 99213 OFFICE O/P EST LOW 20 MIN: CPT | Performed by: STUDENT IN AN ORGANIZED HEALTH CARE EDUCATION/TRAINING PROGRAM

## 2024-01-30 PROCEDURE — 92557 COMPREHENSIVE HEARING TEST: CPT | Performed by: AUDIOLOGIST

## 2024-01-30 PROCEDURE — 1036F TOBACCO NON-USER: CPT | Performed by: STUDENT IN AN ORGANIZED HEALTH CARE EDUCATION/TRAINING PROGRAM

## 2024-01-30 ASSESSMENT — PATIENT HEALTH QUESTIONNAIRE - PHQ9
1. LITTLE INTEREST OR PLEASURE IN DOING THINGS: NOT AT ALL
2. FEELING DOWN, DEPRESSED OR HOPELESS: NOT AT ALL
SUM OF ALL RESPONSES TO PHQ9 QUESTIONS 1 AND 2: 0

## 2024-01-30 NOTE — PROGRESS NOTES
CHIEF COMPLAINT: Right TM perforation     HISTORY OF PRESENT ILLNESS: Ms. Walsh is a 38-year-old woman with a history of cleft palate and long standing issues with hearing loss in the right ear. She has undergone multiple sets of tubes on both sides, and has had a previous right-sided tympanoplasty. She has no pain or complaints regarding her ear. She was last seen by Dr. Daly almost a year ago, where she was found to have a small residual perforation on the right. She was not interested in tympanoplasty at that time however she was interested in hearing amplification. No changes to hearing in the interim. Denies drainage, has no issue keeping the ear dry. She was able to get hearing aids through [facility] but would like a new prescription and medical clearance for that.     PAST MEDICAL HISTORY:   Past Medical History:   Diagnosis Date    Candidiasis, unspecified 2022    Yeast infection    Encounter for immunization     COVID-19 vaccine administered    Personal history of other complications of pregnancy, childbirth and the puerperium 2020    History of spontaneous     Personal history of other diseases of the circulatory system 2022    History of hypertension    Varicella without complication     Varicella without complication       PAST SURGICAL HISTORY:   Past Surgical History:   Procedure Laterality Date    OTHER SURGICAL HISTORY  2016    Palatoplasty For Cleft Palate    OTHER SURGICAL HISTORY  12/15/2022     section    OTHER SURGICAL HISTORY  2022    Cleft palate repair       MEDICATIONS:   Current Outpatient Medications:     desogestreL-ethinyl estradioL (Apri) 0.15-0.03 mg tablet, once every 24 hours., Disp: , Rfl:     EPINEPHrine (Epipen) 0.3 mg/0.3 mL injection syringe, Inject 0.3 mL (0.3 mg) into the muscle if needed for anaphylaxis. Inject into UPPER, OUTER THIGH. Call 911 after use., Disp: 2 each, Rfl: 3    EPINEPHrine 0.3 mg/0.3 mL  injection syringe, Inject 0.3 mL (0.3 mg) into the muscle., Disp: , Rfl:     FeroSuL tablet, Take 1 tablet by mouth every other day., Disp: , Rfl:     labetalol (Normodyne) 200 mg tablet, Take 1 tablet (200 mg) by mouth every 12 hours., Disp: 90 tablet, Rfl: 2    loratadine-pseudoephedrine (Claritin-D 24-hour)  mg 24 hr tablet, Take 1 tablet by mouth once daily. Do not crush, chew, or split., Disp: , Rfl:     pantoprazole (Protonix) 40 mg EC tablet, Take 1 tablet (40 mg) by mouth once daily. Do not crush, chew, or split. Take 30 minutes before a meal, Disp: 30 tablet, Rfl: 4    ALLERGIES:   Allergies   Allergen Reactions    Seasonale (91) [Levonorgestrel-Ethinyl Estrad] Anaphylaxis    Cayenne Hives    Penicillin V Hives       SOCIAL HISTORY:   reports that she has never smoked. She has never used smokeless tobacco. She reports that she does not currently use alcohol. She reports that she does not use drugs.    FAMILY HISTORY: family history includes Diabetes in her father and mother; Osteoarthritis in her mother.    PHYSICAL EXAM:    VITALS:   There were no vitals filed for this visit.     GENERAL: healthy, alert, well developed, well nourished, no distress, cooperative, appears chronologic age    Communicates with normal voice and without hearing aids.    HEAD: atraumatic, normocephalic, no lesions    EYES: normal, PERRLA and EOM's intact    EARS:   Right ear demonstrates a cerumen impaction which was removed with curette and #7 suction revealing a small 20% perforation of the drum with a very small amount of debris, anterior quadrant with tympanosclerosis and retraction with a small amount of debris, but no drainage  Left ear: demonstrates a patent external auditory canal with a normal tympanic membrane.       NOSE: nose shows no deformity, asymmetry, or inflammation, nasal mucosa normal, septum midline with no perforation or bleeding, turbinates normal, no polyps, no sinus tenderness    ORAL  CAVITY/OROPHARYNX: negative findings: lips normal without lesions, buccal mucosa normal, gums healthy, teeth intact, non-carious, palate normal, tongue midline and normal, soft palate, uvula, and tonsils normal    NECK AND SALIVARY GLANDS: Neck is supple without masses or lymphadenopathy. Palpation of the parotid and submandibular gland reveals no masses or tenderness to palpation.    CRANIAL NERVES: intact,   Facial nerve exam  is House - Brackmann 1- Normal Function on the right and 1- Normal Function on the left.    CARDIOVASCULAR: radial pulse is palpable and regular, no evidence of peripheral edema    PULMONARY: normal lung excursion, no evidence of retractions    DATA REVIEWED:  Audiogram  I reviewed the audiogram, which demonstrates right moderate mixed hearing loss with a type B tympanogram  Right ear:  PTA 45 dB      SRT 35 dB       Tympanogram Type B    Left ear :   PTA 12 dB      SRT 10 dB        Tympanogram Type A        IMPRESSION:   1) This is a 38 year old female with a history of right tympanoplasty and residual perforation. She is not interested in tympanoplasty at this time.  There is anterior superior retraction with a small amount of debris but no drainage.  I think we may continue to observe this. Her audiogram today is stable from last.     PLAN:  1) Prescription for hearing aid/medical clearance given today   2) RTC in 6 months for continued observation   3) continue dry ear precautions, RTC sooner if there is an abrupt loss of hearing or drainage    I saw and evaluated the patient. I personally obtained the key and critical portions of the history and physical exam or was physically present for key and critical portions performed by the resident/fellow. I reviewed the resident/fellow's documentation and discussed the patient with the resident/fellow. I agree with the resident/fellow's medical decision making as documented in the note.    John Daly MD

## 2024-01-30 NOTE — PROGRESS NOTES
"  ADULT AUDIOMETRIC EVALUATION    Name:  Melanie Walsh  :  1985  Age:  38 y.o.  Date of Evaluation:  2024    HISTORY:  Reason for visit: Ms. Walsh is seen today for an evaluation of hearing in conjunction with seeing Dr. Daly. Patient was unaccompanied.   Patient has history of cleft palate and long standing issues with hearing loss in the right ear. She has undergone multiple sets of tubes on both sides, and has had a previous right-sided tympanoplasty. Previous audiogram dated 3/17/2023 showed normal hearing in the left ear and a moderate rising to mild mixed hearing loss in the right ear with conductive components at 500 and 4000 Hz.    Patient reports subjectively stable hearing levels.  She reports that she has not pursued a hearing aid for the right ear.    Denies: otorrhea and tinnitus    EVALUATION:    See Audiogram and Immittance results under \"Media\".    RESULTS:     Otoscopic Evaluation:     RIGHT  Significant amount of cerumen obscuring visualization of the tympanic membrane    LEFT  Clear canal with tympanic membrane visualized    Immittance:   Immittance Measures: 226 Hz          Right Ear: Type B large volume         Left Ear:  Type A: Normal middle ear function    Reflexes and Reflex Decay:    Ipsilateral Reflexes (500-4000 Hz):          Probe/Stimulus Right Ear:  Did not test       Probe/Stimulus Left Ear: present    Audiometry:  Test Technique: Standard Audiometry under insert phones.    Reliability: Good     Pure Tone Audiometry:    Right: Essentially moderate mixed hearing loss recovering to normal at 4000 Hz falling to mild at 8000 Hz   Left: Hearing levels within normal limits 125 Hz through 8000 Hz.       Speech Audiometry (via recorded, 25-words unless noted; M=masked):       Right Ear: Speech Reception Threshold (SRT) was obtained at 35(M) dBHL  Word Recognition Scores were Excellent (92%) in quiet when words were presented at 75(M) dBHL, using the NU-6 2A word " list.  Left Ear: Speech Reception Threshold (SRT) was obtained at 10 dBHL  Word Recognition Scores were Excellent (96%) in quiet when words were presented at 50 dBHL, using the NU-6 3A word list.    IMPRESSIONS:  Today's test results suggest normal middle ear function in the left ear and large ear canal volume measurement in the right ear.       Right: Essentially moderate mixed hearing loss recovering to normal at 4000 Hz falling to mild at 8000 Hz   Left: Hearing levels within normal limits 125 Hz through 8000 Hz.    Compared to 3/17/2023 hearing levels remained stable in the left ear and essentially stable in the right ear.     PATIENT EDUCATION:   Melanie Walsh was counseled with regard to the findings.       PLAN:  Medical follow up with John Daly MD as directed.   Retest hearing annually; sooner if concerns or changes arise.  Consideration of hearing aid evaluation to assess need for hearing amplification. Your insurance may have a benefit for hearing aids through a preferred provider network.        Liv Ariza, ANTIONE, CCC-A  Clinical Audiologist    Time: 0907-0930    Degree of   Hearing Sensitivity dB Range   Within Normal Limits (WNL) 0 - 20   Slight 25   Mild 26 - 40   Moderate 41 - 55   Moderately-Severe 56 - 70   Severe 71 - 90   Profound 91 +     KEY  TM Tympanic Membrane   WNL Within Normal Limits   HA Hearing Aid   SNHL Sensorineural Hearing Loss   CHL Conductive Hearing Loss   NIHL Noise-Induced Hearing Loss   ECV Ear Canal Volume

## 2024-02-07 DIAGNOSIS — R10.13 EPIGASTRIC PAIN: Primary | ICD-10-CM

## 2024-02-07 DIAGNOSIS — R11.11 VOMITING WITHOUT NAUSEA, UNSPECIFIED VOMITING TYPE: ICD-10-CM

## 2024-02-12 ASSESSMENT — PROMIS GLOBAL HEALTH SCALE
RATE_QUALITY_OF_LIFE: VERY GOOD
CARRYOUT_SOCIAL_ACTIVITIES: VERY GOOD
RATE_SOCIAL_SATISFACTION: VERY GOOD
EMOTIONAL_PROBLEMS: RARELY
RATE_AVERAGE_FATIGUE: MODERATE
RATE_PHYSICAL_HEALTH: VERY GOOD
RATE_GENERAL_HEALTH: VERY GOOD
RATE_MENTAL_HEALTH: VERY GOOD
RATE_AVERAGE_PAIN: 1
CARRYOUT_PHYSICAL_ACTIVITIES: COMPLETELY

## 2024-02-16 ENCOUNTER — APPOINTMENT (OUTPATIENT)
Dept: PRIMARY CARE | Facility: CLINIC | Age: 39
End: 2024-02-16
Payer: COMMERCIAL

## 2024-02-16 ENCOUNTER — APPOINTMENT (OUTPATIENT)
Dept: RADIOLOGY | Facility: CLINIC | Age: 39
End: 2024-02-16
Payer: COMMERCIAL

## 2024-02-22 ENCOUNTER — HOSPITAL ENCOUNTER (OUTPATIENT)
Dept: RADIOLOGY | Facility: CLINIC | Age: 39
End: 2024-02-22
Payer: COMMERCIAL

## 2024-03-01 ENCOUNTER — OFFICE VISIT (OUTPATIENT)
Dept: PRIMARY CARE | Facility: CLINIC | Age: 39
End: 2024-03-01
Payer: COMMERCIAL

## 2024-03-01 VITALS
DIASTOLIC BLOOD PRESSURE: 60 MMHG | HEART RATE: 74 BPM | TEMPERATURE: 96.9 F | BODY MASS INDEX: 42.96 KG/M2 | OXYGEN SATURATION: 98 % | HEIGHT: 67 IN | RESPIRATION RATE: 14 BRPM | WEIGHT: 273.7 LBS | SYSTOLIC BLOOD PRESSURE: 118 MMHG

## 2024-03-01 DIAGNOSIS — E66.01 CLASS 3 SEVERE OBESITY WITH BODY MASS INDEX (BMI) OF 40.0 TO 44.9 IN ADULT, UNSPECIFIED OBESITY TYPE, UNSPECIFIED WHETHER SERIOUS COMORBIDITY PRESENT (MULTI): ICD-10-CM

## 2024-03-01 DIAGNOSIS — J01.00 ACUTE NON-RECURRENT MAXILLARY SINUSITIS: Primary | ICD-10-CM

## 2024-03-01 PROBLEM — Q37.9 CLEFT LIP AND PALATE (HHS-HCC): Status: RESOLVED | Noted: 2023-09-02 | Resolved: 2024-03-01

## 2024-03-01 PROCEDURE — 3008F BODY MASS INDEX DOCD: CPT | Performed by: STUDENT IN AN ORGANIZED HEALTH CARE EDUCATION/TRAINING PROGRAM

## 2024-03-01 PROCEDURE — 1036F TOBACCO NON-USER: CPT | Performed by: STUDENT IN AN ORGANIZED HEALTH CARE EDUCATION/TRAINING PROGRAM

## 2024-03-01 PROCEDURE — 99214 OFFICE O/P EST MOD 30 MIN: CPT | Performed by: STUDENT IN AN ORGANIZED HEALTH CARE EDUCATION/TRAINING PROGRAM

## 2024-03-01 PROCEDURE — 3078F DIAST BP <80 MM HG: CPT | Performed by: STUDENT IN AN ORGANIZED HEALTH CARE EDUCATION/TRAINING PROGRAM

## 2024-03-01 PROCEDURE — 3074F SYST BP LT 130 MM HG: CPT | Performed by: STUDENT IN AN ORGANIZED HEALTH CARE EDUCATION/TRAINING PROGRAM

## 2024-03-01 RX ORDER — FLUTICASONE PROPIONATE 50 MCG
1 SPRAY, SUSPENSION (ML) NASAL DAILY
Qty: 16 G | Refills: 5 | Status: SHIPPED | OUTPATIENT
Start: 2024-03-01 | End: 2024-03-01 | Stop reason: SDUPTHER

## 2024-03-01 RX ORDER — AZITHROMYCIN 250 MG/1
TABLET, FILM COATED ORAL
Qty: 6 TABLET | Refills: 0 | Status: SHIPPED | OUTPATIENT
Start: 2024-03-01 | End: 2024-03-01 | Stop reason: SDUPTHER

## 2024-03-01 RX ORDER — AZITHROMYCIN 250 MG/1
TABLET, FILM COATED ORAL
Qty: 6 TABLET | Refills: 0 | Status: SHIPPED | OUTPATIENT
Start: 2024-03-01 | End: 2024-03-05

## 2024-03-01 RX ORDER — FLUTICASONE PROPIONATE 50 MCG
1 SPRAY, SUSPENSION (ML) NASAL DAILY
Qty: 16 G | Refills: 5 | Status: SHIPPED | OUTPATIENT
Start: 2024-03-01 | End: 2025-03-01

## 2024-03-01 ASSESSMENT — PATIENT HEALTH QUESTIONNAIRE - PHQ9
2. FEELING DOWN, DEPRESSED OR HOPELESS: NOT AT ALL
1. LITTLE INTEREST OR PLEASURE IN DOING THINGS: NOT AT ALL
SUM OF ALL RESPONSES TO PHQ9 QUESTIONS 1 AND 2: 0

## 2024-03-01 ASSESSMENT — ENCOUNTER SYMPTOMS
COUGH: 1
RHINORRHEA: 1

## 2024-03-01 NOTE — PROGRESS NOTES
Subjective   Patient ID: Melanie Walsh is a pleasant 38 y.o. female who presents for Follow-up (Follow up- pain in left ear- little bit of tinge not pain. Yesterday ear pain).  Earache   There is pain in the left ear. This is a new problem. The current episode started yesterday. The problem has been gradually improving. There has been no fever. Associated symptoms include coughing and rhinorrhea. Pertinent negatives include no ear discharge. Associated symptoms comments: Congestion , chills . Treatments tried: Tussin and cough drops.     She is still breast-feeding.  She is also allergic to penicillin (hives)  I reached out to her OB/GYN Dr. Parrish who stated that azithromycin will be safe to be taken while lactating.    Review of Systems   HENT:  Positive for ear pain and rhinorrhea. Negative for ear discharge.    Respiratory:  Positive for cough.    All other systems reviewed and are negative.      Visit Vitals  /60 (Patient Position: Sitting)   Pulse 74   Temp 36.1 °C (96.9 °F)   Resp 14          Objective   Physical Exam  Constitutional:       General: She is not in acute distress.     Appearance: Normal appearance.   HENT:      Head: Normocephalic and atraumatic.   Eyes:      General: No scleral icterus.     Conjunctiva/sclera: Conjunctivae normal.   Cardiovascular:      Rate and Rhythm: Normal rate and regular rhythm.      Heart sounds: Normal heart sounds.   Pulmonary:      Effort: Pulmonary effort is normal.      Breath sounds: Normal breath sounds. No wheezing.   Abdominal:      General: Bowel sounds are normal. There is no distension.      Palpations: Abdomen is soft.      Tenderness: There is no abdominal tenderness.   Musculoskeletal:      Cervical back: Neck supple.      Right lower leg: No edema.      Left lower leg: No edema.   Lymphadenopathy:      Cervical: No cervical adenopathy.   Skin:     General: Skin is warm and dry.   Neurological:      General: No focal deficit present.       Mental Status: She is alert and oriented to person, place, and time.   Psychiatric:         Mood and Affect: Mood normal.         Behavior: Behavior normal.         Assessment/Plan   Problem List Items Addressed This Visit    None  Visit Diagnoses       Acute non-recurrent maxillary sinusitis    -  Primary    Relevant Medications    azithromycin (Zithromax) 250 mg tablet    fluticasone (Flonase) 50 mcg/actuation nasal spray    Class 3 severe obesity with body mass index (BMI) of 40.0 to 44.9 in adult, unspecified obesity type, unspecified whether serious comorbidity present (CMS/[facility])        Relevant Orders    Basic metabolic panel    Hemoglobin A1C    Lipid Panel Non-Fasting    LDL cholesterol, direct    CBC and Auto Differential

## 2024-03-01 NOTE — PATIENT INSTRUCTIONS
Start antibiotic , flonase and saline rinse   Blood work before your next visit.  If you receive medical information from My Chart, your results will be released into your online chart. This means you may view or see results before someone from our office contact you directly.  Please keep in mind that if blood work or imaging were ordered during your visit, all the nonurgent lab results will be discussed with you at your next office visit.  Please arrive 15 minutes before your appointment.   Follow-up with primary care in May or as needed

## 2024-03-04 ENCOUNTER — HOSPITAL ENCOUNTER (OUTPATIENT)
Dept: RADIOLOGY | Facility: CLINIC | Age: 39
Discharge: HOME | End: 2024-03-04
Payer: COMMERCIAL

## 2024-03-04 DIAGNOSIS — R10.13 EPIGASTRIC PAIN: ICD-10-CM

## 2024-03-04 DIAGNOSIS — R11.11 VOMITING WITHOUT NAUSEA, UNSPECIFIED VOMITING TYPE: ICD-10-CM

## 2024-03-04 DIAGNOSIS — K80.20 GALL BLADDER STONES: Primary | ICD-10-CM

## 2024-03-04 PROCEDURE — 76705 ECHO EXAM OF ABDOMEN: CPT | Mod: FOREIGN READ | Performed by: RADIOLOGY

## 2024-03-04 PROCEDURE — 76705 ECHO EXAM OF ABDOMEN: CPT

## 2024-03-04 NOTE — RESULT ENCOUNTER NOTE
Your ultrasound shows fatty liver, which diet and exercise are recommended for. You also have gallstones.  I will refer you to a surgeon to discuss further.

## 2024-03-06 ENCOUNTER — PATIENT MESSAGE (OUTPATIENT)
Dept: PRIMARY CARE | Facility: CLINIC | Age: 39
End: 2024-03-06
Payer: COMMERCIAL

## 2024-03-06 DIAGNOSIS — E66.01 CLASS 3 SEVERE OBESITY WITH BODY MASS INDEX (BMI) OF 40.0 TO 44.9 IN ADULT, UNSPECIFIED OBESITY TYPE, UNSPECIFIED WHETHER SERIOUS COMORBIDITY PRESENT (MULTI): Primary | ICD-10-CM

## 2024-03-14 ENCOUNTER — APPOINTMENT (OUTPATIENT)
Dept: OBSTETRICS AND GYNECOLOGY | Facility: CLINIC | Age: 39
End: 2024-03-14
Payer: COMMERCIAL

## 2024-03-18 ENCOUNTER — OFFICE VISIT (OUTPATIENT)
Dept: SURGERY | Facility: CLINIC | Age: 39
End: 2024-03-18
Payer: COMMERCIAL

## 2024-03-18 VITALS
TEMPERATURE: 97 F | HEART RATE: 85 BPM | WEIGHT: 279 LBS | BODY MASS INDEX: 43.7 KG/M2 | SYSTOLIC BLOOD PRESSURE: 126 MMHG | DIASTOLIC BLOOD PRESSURE: 79 MMHG

## 2024-03-18 DIAGNOSIS — K80.20 GALL BLADDER STONES: ICD-10-CM

## 2024-03-18 PROCEDURE — 99203 OFFICE O/P NEW LOW 30 MIN: CPT | Performed by: SURGERY

## 2024-03-18 PROCEDURE — 1036F TOBACCO NON-USER: CPT | Performed by: SURGERY

## 2024-03-18 PROCEDURE — 3074F SYST BP LT 130 MM HG: CPT | Performed by: SURGERY

## 2024-03-18 PROCEDURE — 3008F BODY MASS INDEX DOCD: CPT | Performed by: SURGERY

## 2024-03-18 PROCEDURE — 3078F DIAST BP <80 MM HG: CPT | Performed by: SURGERY

## 2024-03-18 ASSESSMENT — ENCOUNTER SYMPTOMS
NAUSEA: 1
PALPITATIONS: 0
ABDOMINAL PAIN: 1
COUGH: 0
CONSTIPATION: 0
DIARRHEA: 0
UNEXPECTED WEIGHT CHANGE: 0
FEVER: 0
SHORTNESS OF BREATH: 0
VOMITING: 1
CHILLS: 0
COLOR CHANGE: 0

## 2024-03-18 ASSESSMENT — PAIN SCALES - GENERAL: PAINLEVEL: 0-NO PAIN

## 2024-03-18 NOTE — PROGRESS NOTES
Subjective        Expand All Collapse All     Subjective   Patient ID: Melanie Walsh is a 38 y.o. female who presents for No chief complaint on file..  HPI  Patient is a 38 year old female presenting to outpatient clinic for gall stones. RUQ US from 2024 demonstrated multiple gall stones. Patient reports epigastric abdominal pain, occurring approximately 2 times per month. Describes pain as stabbing, mostly notices it at night after eating out or heavy meals. Vomits with pain, after which, pain is relieved. Uses tums with good relief. Denies fever/chills, changes in bowel or bladder habits, appetite change, regurgitation, dysphagia, odynophagia, skin changes. History of GERD on Protonix, following with GI. Currently breastfeeding. Appointment with nutritionist in May.      Past medical history: GERD, HTN  Surgical History:    Social History: Denies EtOH, smoking, illicit drug use  Family History: Gall bladder removal (mother), pancreatitis (mother)      Review of Systems   Constitutional:  Negative for chills, fever and unexpected weight change.   Respiratory:  Negative for cough and shortness of breath.    Cardiovascular:  Negative for chest pain and palpitations.   Gastrointestinal:  Positive for abdominal pain, nausea and vomiting. Negative for constipation and diarrhea.   Genitourinary: Negative.    Skin:  Negative for color change and rash.               Objective []Expand by Default  Physical Exam  Constitutional:       Appearance: Normal appearance. She is obese.   Eyes:      General: No scleral icterus.  Cardiovascular:      Rate and Rhythm: Normal rate and regular rhythm.   Pulmonary:      Effort: Pulmonary effort is normal.      Breath sounds: Normal breath sounds.   Abdominal:      General: Abdomen is flat. Bowel sounds are normal.      Palpations: Abdomen is soft.   Skin:     General: Skin is warm and dry.      Coloration: Skin is not jaundiced.   Neurological:      Mental  Status: She is alert.               Assessment/Plan     Patient is a 38 year old presenting for gall stones, possibly symptomatic in setting of concurrent GERD. February 2024 RUQ US with numerous stones. Discussed etiology, prognosis, and treatment options including cholecystectomy versus observation. Discussed risks of operation including bleeding, infection, injury to surrounding structures, conversion to open surgery. Patient elects to proceed with observation. Follow up as needed to schedule surgery. Case seen and discussed with attending physician, Dr. Lackey.      MARIANNE Patton 03/18/24 10:13 AM       Dallin Lackey MD 03/18/24 11:23 AM

## 2024-03-18 NOTE — PROGRESS NOTES
Subjective   Patient ID: Melanie Walsh is a 38 y.o. female who presents for No chief complaint on file..  HPI  Patient is a 38 year old female presenting to outpatient clinic for gall stones. RUQ US from 2024 demonstrated multiple gall stones. Patient reports epigastric abdominal pain, occurring approximately 2 times per month. Describes pain as stabbing, mostly notices it at night after eating out or heavy meals. Vomits with pain, after which, pain is relieved. Uses tums with good relief. Denies fever/chills, changes in bowel or bladder habits, appetite change, regurgitation, dysphagia, odynophagia, skin changes. History of GERD on Protonix, following with GI. Currently breastfeeding. Appointment with nutritionist in May.     Past medical history: GERD, HTN  Surgical History:    Social History: Denies EtOH, smoking, illicit drug use  Family History: Gall bladder removal (mother), pancreatitis (mother)     Review of Systems   Constitutional:  Negative for chills, fever and unexpected weight change.   Respiratory:  Negative for cough and shortness of breath.    Cardiovascular:  Negative for chest pain and palpitations.   Gastrointestinal:  Positive for abdominal pain, nausea and vomiting. Negative for constipation and diarrhea.   Genitourinary: Negative.    Skin:  Negative for color change and rash.       Objective   Physical Exam  Constitutional:       Appearance: Normal appearance. She is obese.   Eyes:      General: No scleral icterus.  Cardiovascular:      Rate and Rhythm: Normal rate and regular rhythm.   Pulmonary:      Effort: Pulmonary effort is normal.      Breath sounds: Normal breath sounds.   Abdominal:      General: Abdomen is flat. Bowel sounds are normal.      Palpations: Abdomen is soft.   Skin:     General: Skin is warm and dry.      Coloration: Skin is not jaundiced.   Neurological:      Mental Status: She is alert.       Assessment/Plan     Patient is a 38 year old  presenting for gall stones, possibly symptomatic in setting of concurrent GERD. February 2024 RUQ US with numerous stones. Discussed etiology, prognosis, and treatment options including cholecystectomy versus observation. Discussed risks of operation including bleeding, infection, injury to surrounding structures, conversion to open surgery. Patient elects to proceed with observation. Follow up as needed to schedule surgery. Case seen and discussed with attending physician, Dr. Lackey.        MARIANNE Patton 03/18/24 10:13 AM

## 2024-04-27 ASSESSMENT — PROMIS GLOBAL HEALTH SCALE
RATE_GENERAL_HEALTH: VERY GOOD
RATE_AVERAGE_PAIN: 0
RATE_QUALITY_OF_LIFE: VERY GOOD
CARRYOUT_PHYSICAL_ACTIVITIES: COMPLETELY
CARRYOUT_SOCIAL_ACTIVITIES: VERY GOOD
RATE_SOCIAL_SATISFACTION: GOOD
RATE_AVERAGE_FATIGUE: MODERATE
RATE_PHYSICAL_HEALTH: VERY GOOD
EMOTIONAL_PROBLEMS: RARELY
RATE_MENTAL_HEALTH: VERY GOOD

## 2024-05-01 ENCOUNTER — NUTRITION (OUTPATIENT)
Dept: PRIMARY CARE | Facility: CLINIC | Age: 39
End: 2024-05-01
Payer: COMMERCIAL

## 2024-05-01 ENCOUNTER — LAB (OUTPATIENT)
Dept: LAB | Facility: LAB | Age: 39
End: 2024-05-01
Payer: COMMERCIAL

## 2024-05-01 VITALS — HEIGHT: 67 IN | BODY MASS INDEX: 43.7 KG/M2

## 2024-05-01 DIAGNOSIS — Z86.39 HISTORY OF VITAMIN D DEFICIENCY: ICD-10-CM

## 2024-05-01 DIAGNOSIS — E66.01 CLASS 3 SEVERE OBESITY WITH BODY MASS INDEX (BMI) OF 40.0 TO 44.9 IN ADULT, UNSPECIFIED OBESITY TYPE, UNSPECIFIED WHETHER SERIOUS COMORBIDITY PRESENT (MULTI): ICD-10-CM

## 2024-05-01 DIAGNOSIS — E66.01 CLASS 3 SEVERE OBESITY WITH BODY MASS INDEX (BMI) OF 40.0 TO 44.9 IN ADULT, UNSPECIFIED OBESITY TYPE, UNSPECIFIED WHETHER SERIOUS COMORBIDITY PRESENT (MULTI): Primary | ICD-10-CM

## 2024-05-01 LAB
ANION GAP SERPL CALC-SCNC: 11 MMOL/L (ref 10–20)
BASOPHILS # BLD AUTO: 0.11 X10*3/UL (ref 0–0.1)
BASOPHILS NFR BLD AUTO: 1.2 %
BUN SERPL-MCNC: 16 MG/DL (ref 6–23)
CALCIUM SERPL-MCNC: 9.6 MG/DL (ref 8.6–10.6)
CHLORIDE SERPL-SCNC: 103 MMOL/L (ref 98–107)
CHOLEST SERPL-MCNC: 164 MG/DL (ref 0–199)
CHOLESTEROL/HDL RATIO: 2.5
CO2 SERPL-SCNC: 28 MMOL/L (ref 21–32)
CREAT SERPL-MCNC: 0.58 MG/DL (ref 0.5–1.05)
EGFRCR SERPLBLD CKD-EPI 2021: >90 ML/MIN/1.73M*2
EOSINOPHIL # BLD AUTO: 0.23 X10*3/UL (ref 0–0.7)
EOSINOPHIL NFR BLD AUTO: 2.6 %
ERYTHROCYTE [DISTWIDTH] IN BLOOD BY AUTOMATED COUNT: 12.7 % (ref 11.5–14.5)
EST. AVERAGE GLUCOSE BLD GHB EST-MCNC: 100 MG/DL
GLUCOSE SERPL-MCNC: 90 MG/DL (ref 74–99)
HBA1C MFR BLD: 5.1 %
HCT VFR BLD AUTO: 43.4 % (ref 36–46)
HDLC SERPL-MCNC: 66.6 MG/DL
HGB BLD-MCNC: 14.4 G/DL (ref 12–16)
IMM GRANULOCYTES # BLD AUTO: 0.03 X10*3/UL (ref 0–0.7)
IMM GRANULOCYTES NFR BLD AUTO: 0.3 % (ref 0–0.9)
LDLC SERPL DIRECT ASSAY-MCNC: 94 MG/DL (ref 0–129)
LYMPHOCYTES # BLD AUTO: 3.89 X10*3/UL (ref 1.2–4.8)
LYMPHOCYTES NFR BLD AUTO: 43.3 %
MCH RBC QN AUTO: 29.8 PG (ref 26–34)
MCHC RBC AUTO-ENTMCNC: 33.2 G/DL (ref 32–36)
MCV RBC AUTO: 90 FL (ref 80–100)
MONOCYTES # BLD AUTO: 0.56 X10*3/UL (ref 0.1–1)
MONOCYTES NFR BLD AUTO: 6.2 %
NEUTROPHILS # BLD AUTO: 4.17 X10*3/UL (ref 1.2–7.7)
NEUTROPHILS NFR BLD AUTO: 46.4 %
NON-HDL CHOLESTEROL: 97 MG/DL (ref 0–149)
NRBC BLD-RTO: 0 /100 WBCS (ref 0–0)
PLATELET # BLD AUTO: 399 X10*3/UL (ref 150–450)
POTASSIUM SERPL-SCNC: 4.3 MMOL/L (ref 3.5–5.3)
RBC # BLD AUTO: 4.83 X10*6/UL (ref 4–5.2)
SODIUM SERPL-SCNC: 138 MMOL/L (ref 136–145)
WBC # BLD AUTO: 9 X10*3/UL (ref 4.4–11.3)

## 2024-05-01 PROCEDURE — 83036 HEMOGLOBIN GLYCOSYLATED A1C: CPT

## 2024-05-01 PROCEDURE — 97802 MEDICAL NUTRITION INDIV IN: CPT

## 2024-05-01 PROCEDURE — 82306 VITAMIN D 25 HYDROXY: CPT

## 2024-05-01 PROCEDURE — 82465 ASSAY BLD/SERUM CHOLESTEROL: CPT

## 2024-05-01 PROCEDURE — 83721 ASSAY OF BLOOD LIPOPROTEIN: CPT

## 2024-05-01 PROCEDURE — 36415 COLL VENOUS BLD VENIPUNCTURE: CPT

## 2024-05-01 PROCEDURE — 83718 ASSAY OF LIPOPROTEIN: CPT

## 2024-05-01 PROCEDURE — 80048 BASIC METABOLIC PNL TOTAL CA: CPT

## 2024-05-01 PROCEDURE — 85025 COMPLETE CBC W/AUTO DIFF WBC: CPT

## 2024-05-01 NOTE — PATIENT INSTRUCTIONS
Keep a food log for 1 week for us to review at next visit. We can use it to help us with meal planning. Email it to [email or web address] prior to next visit.

## 2024-05-01 NOTE — PROGRESS NOTES
Nutrition: Initial Assessment    Reason for Nutrition Visit: Patient is a 38 y.o. female referred for obesity. Referred on 3/7/24 by Dr. More Wise. Per chart review, pt has h/o gallstones.       Nutrition Assessment    Food & Nutrition Related History: Pt presents in office. Looking to make changes to her lifestyle. Her  has prediabetes. Her daughter is 1.5 years. Pt is currently nursing. Would like to have a more intentional plan with her meals.     Dietary Considerations: Tofu   Allergies: blue cheese  Intolerance: None  Appetite: Good  Intake: >75%  GI Symptoms : reflux with fatty foods   Swallowing Difficulty: No problems with swallowing  Dentition : own  Food Preparation: Patient  Cooking Skills/Barriers: None reported  Grocery Shopping: Patient  - [employer] and [employer], goes grocery shopping 1x per week   Supplements: prenatal  daily  Food Insecurity: Denies     [place] (1x per week), chicken, pork (1x per week), beef (1x per week)   Sides - quinoa, rice, vegetables       Dietary Recall:   Meal 1: eggs with spinach and feta   Meal 2: taco bell  Meal 3: pizza     Snacks: walnuts, cheese crackers, fruit   Beverages: water, coffee with sugar and cream   Eating out: 1x per week is typical   Alcohol Intake: none  Self-Identified Challenges: had a negative relationship with food in the past, met with therapist     Physical Activity: Going for daily walks     Labs:  Lab Results   Component Value Date    HGBA1C 5.2 01/03/2023    HGBA1C 5.4 04/28/2022    HGBA1C 5.3 03/16/2022     01/03/2023    K 4.7 01/03/2023     01/03/2023    CO2 26 01/03/2023    BUN 17 01/03/2023    CREATININE 0.73 01/03/2023    CALCIUM 9.9 01/03/2023    ALBUMIN 4.3 01/03/2023    PROT 6.7 01/03/2023    BILITOT 0.4 01/03/2023    ALKPHOS 104 01/03/2023    ALT 28 01/03/2023    AST 17 01/03/2023    GLUCOSE 86 01/03/2023    CHOL 174 01/03/2023    TRIG 68 09/10/2021    HDL 61.0 01/03/2023   Comments: Low vitamin D = 29 (1/3/23).  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)    Dermatographism    Edema, leg    Elderly multigravida with antepartum condition or complication ([facility])    Female infertility    Hearing loss    History of vitamin D deficiency    Lactation disorder ([facility])    Mixed conductive and sensorineural hearing loss of right ear with unrestricted hearing of left ear    Drug-induced obesity    PCOS (polycystic ovarian syndrome)    Postpartum depression    High risk multigravida in third trimester ([facility])    Primigravida of advanced maternal age in third trimester ([facility])    Recurrent acute serous otitis media of right ear    Allergic rhinitis    Gastroesophageal reflux disease    Epigastric pain    Vomiting without nausea    Gall bladder stones        Anthropometrics:  Ht Readings from Last 1 Encounters:   05/01/24 1.702 m (5' 7\")     BMI Readings from Last 1 Encounters:   05/01/24 43.70 kg/m²     Wt Readings from Last 10 Encounters:   03/18/24 127 kg (279 lb)   03/01/24 124 kg (273 lb 11.2 oz)   01/30/24 126 kg (277 lb 9.6 oz)   12/13/23 114 kg (252 lb 6 oz)   12/07/23 124 kg (274 lb)   10/09/23 128 kg (282 lb)   09/13/23 125 kg (276 lb 4 oz)   06/15/23 127 kg (280 lb)   04/04/23 125 kg (275 lb 1.6 oz)   03/08/23 121 kg (266 lb 9 oz)       Estimated Nutrition Needs:    Total Energy Estimated Needs (kCal): 1875 kCal   Method for Estimating Needs: MSJ 1983 x 1.2 - 500 (for weight loss)   Total Protein Estimated Needs (g): 100 g   Total Protein Estimated Needs (g/kg): 0.8 " g/kg    Nutrition Diagnosis     Patient has Nutrition Diagnosis: Yes Diagnosis Status (1): New  Nutrition Diagnosis 1: Food and nutrition related knowledge deficit Related to (1): limited prior nutrition education on general nutrition topics (meal planning) provided by a nutrition professional As Evidenced by (1): patient's desire to learn, patient's questions       Nutrition Interventions/Recommendations   FOOD & NUTRIENT DELIVERY: Decreased Fat Diet, Decreased Sodium Diet, Increased Fiber Diet, and Low Saturated Fat Diet    NUTRITION EDUCATION:     1) Provided education on Plate Method as a tool for preparing balanced meals. Discussed the following:   -Fill 1/2 plate with non-starchy vegetables (broccoli, carrots, cauliflower, salad greens, cucumbers, tomatoes). These foods contribute very few carbohydrates, and they add fiber to the meal.   -Fill 1/4 plate with lean protein (meat, turkey, fish, seafood, eggs, nuts, cheese, cottage cheese, nut butter, tofu, edamame).   -Fill 1/4 plate with carbohydrates/starches (grains, fruits, starchy vegetables, beans, yogurt, milk). Aim for at least half of daily grains as whole grains.     2) Make sure you are eating adequate protein. Protein is important for maintaining your muscle mass and helps us to stay full longer. Protein sources include meat, poultry, eggs, beans, lentils, nuts, nut butters, seeds, tofu (and other soy-based proteins), and some dairy products like milk, yogurt, and cheese.     3) Discussed balanced breakfast ideas - avocado toast with egg, breakfast sandwich, Greek yogurt with fruit.     4) Talked about tips for eating healthier when on vacation - split an entree and salad with  when eating out, prep healthy snacks ahead of time in the condo, purchase a veggie track to snack from when at the condo.     5) Suggested using diabetesfoodhub.org for balanced recipe ideas.     6) Talked about need for lower fat eating pattern due to gallstones.      Educational Handouts: ADA Plate Method, Mediterranean Meal Plan Booklet    *Patient expressed understanding of the education provided and denied any additional questions/concerns.       Nutrition Monitoring and Evaluation   Nutrition Goals : Consistent meal/snack pattern  Increase awareness and respond to hunger cues  Increase awareness and respond to satiety cues  Reduce high fat foods     Readiness to Change : Excellent  Level of Understanding : Excellent  Anticipated Compliant : Excellent     Follow-up: 4-6 weeks

## 2024-05-02 ENCOUNTER — OFFICE VISIT (OUTPATIENT)
Dept: PRIMARY CARE | Facility: CLINIC | Age: 39
End: 2024-05-02
Payer: COMMERCIAL

## 2024-05-02 VITALS
DIASTOLIC BLOOD PRESSURE: 60 MMHG | OXYGEN SATURATION: 97 % | HEART RATE: 76 BPM | HEIGHT: 67 IN | WEIGHT: 287.37 LBS | TEMPERATURE: 98.1 F | SYSTOLIC BLOOD PRESSURE: 116 MMHG | BODY MASS INDEX: 45.1 KG/M2 | RESPIRATION RATE: 12 BRPM

## 2024-05-02 DIAGNOSIS — Z86.39 HISTORY OF VITAMIN D DEFICIENCY: ICD-10-CM

## 2024-05-02 DIAGNOSIS — R06.2 WHEEZING DUE TO ALLERGY: ICD-10-CM

## 2024-05-02 DIAGNOSIS — I10 BENIGN ESSENTIAL HYPERTENSION: ICD-10-CM

## 2024-05-02 DIAGNOSIS — J30.89 NON-SEASONAL ALLERGIC RHINITIS DUE TO OTHER ALLERGIC TRIGGER: ICD-10-CM

## 2024-05-02 DIAGNOSIS — D22.9 SKIN MOLE: ICD-10-CM

## 2024-05-02 DIAGNOSIS — Z00.00 ANNUAL PHYSICAL EXAM: Primary | ICD-10-CM

## 2024-05-02 DIAGNOSIS — T78.40XA WHEEZING DUE TO ALLERGY: ICD-10-CM

## 2024-05-02 DIAGNOSIS — J30.2 SEASONAL ALLERGIES: ICD-10-CM

## 2024-05-02 LAB — 25(OH)D3 SERPL-MCNC: 33 NG/ML (ref 30–100)

## 2024-05-02 PROCEDURE — 3078F DIAST BP <80 MM HG: CPT | Performed by: STUDENT IN AN ORGANIZED HEALTH CARE EDUCATION/TRAINING PROGRAM

## 2024-05-02 PROCEDURE — 99395 PREV VISIT EST AGE 18-39: CPT | Performed by: STUDENT IN AN ORGANIZED HEALTH CARE EDUCATION/TRAINING PROGRAM

## 2024-05-02 PROCEDURE — 3074F SYST BP LT 130 MM HG: CPT | Performed by: STUDENT IN AN ORGANIZED HEALTH CARE EDUCATION/TRAINING PROGRAM

## 2024-05-02 PROCEDURE — 3008F BODY MASS INDEX DOCD: CPT | Performed by: STUDENT IN AN ORGANIZED HEALTH CARE EDUCATION/TRAINING PROGRAM

## 2024-05-02 PROCEDURE — 1036F TOBACCO NON-USER: CPT | Performed by: STUDENT IN AN ORGANIZED HEALTH CARE EDUCATION/TRAINING PROGRAM

## 2024-05-02 RX ORDER — PREDNISONE 20 MG/1
20 TABLET ORAL DAILY
Qty: 5 TABLET | Refills: 0 | Status: SHIPPED | OUTPATIENT
Start: 2024-05-02 | End: 2024-05-07

## 2024-05-02 RX ORDER — ALBUTEROL SULFATE 90 UG/1
2 AEROSOL, METERED RESPIRATORY (INHALATION) EVERY 4 HOURS PRN
Qty: 8 G | Refills: 5 | Status: SHIPPED | OUTPATIENT
Start: 2024-05-02 | End: 2025-05-02

## 2024-05-02 RX ORDER — MINERAL OIL
180 ENEMA (ML) RECTAL DAILY
Qty: 30 TABLET | Refills: 11 | Status: SHIPPED | OUTPATIENT
Start: 2024-05-02

## 2024-05-02 RX ORDER — LABETALOL 200 MG/1
1 TABLET, FILM COATED ORAL EVERY 12 HOURS
Qty: 90 TABLET | Refills: 2 | Status: SHIPPED | OUTPATIENT
Start: 2024-05-02

## 2024-05-02 ASSESSMENT — PATIENT HEALTH QUESTIONNAIRE - PHQ9
SUM OF ALL RESPONSES TO PHQ9 QUESTIONS 1 AND 2: 0
1. LITTLE INTEREST OR PLEASURE IN DOING THINGS: NOT AT ALL
2. FEELING DOWN, DEPRESSED OR HOPELESS: NOT AT ALL

## 2024-05-02 NOTE — PROGRESS NOTES
Subjective   Patient ID: Melanie Walsh is a pleasant 38 y.o. female who presents for Annual Exam (physical).  HPI    HM:  Blood work reviewed  UTD with immunization   Still breast-feeding.  She has seasonal allergies and has been taking Zyrtec.  Reports that her symptoms are not well-controlled especially during the season change.  She has seen allergy immunology last year.  Reports occasional wheezing associated with her allergy symptoms.  Is any significant chest pain or shortness of breath or palpitations today.    Review of Systems   All other systems reviewed and are negative.      Visit Vitals  /60 (Patient Position: Sitting)   Pulse 76   Temp 36.7 °C (98.1 °F)   Resp 12          Objective   Physical Exam  Constitutional:       General: She is not in acute distress.     Appearance: Normal appearance.   HENT:      Head: Normocephalic and atraumatic.   Eyes:      General: No scleral icterus.     Conjunctiva/sclera: Conjunctivae normal.   Cardiovascular:      Rate and Rhythm: Normal rate and regular rhythm.      Heart sounds: Normal heart sounds.   Pulmonary:      Effort: Pulmonary effort is normal.      Breath sounds: Normal breath sounds. No wheezing.   Abdominal:      General: Bowel sounds are normal. There is no distension.      Palpations: Abdomen is soft.      Tenderness: There is no abdominal tenderness.   Musculoskeletal:      Cervical back: Neck supple.      Right lower leg: No edema.      Left lower leg: No edema.   Lymphadenopathy:      Cervical: No cervical adenopathy.   Skin:     General: Skin is warm and dry.   Neurological:      General: No focal deficit present.      Mental Status: She is alert and oriented to person, place, and time.   Psychiatric:         Mood and Affect: Mood normal.         Behavior: Behavior normal.         Assessment/Plan   Problem List Items Addressed This Visit       Benign essential hypertension    Relevant Medications    labetalol (Normodyne) 200 mg tablet     History of vitamin D deficiency    Relevant Orders    Vitamin D 25-Hydroxy,Total (for eval of Vitamin D levels)    Allergic rhinitis     Other Visit Diagnoses       Annual physical exam    -  Primary    Seasonal allergies        Relevant Medications    fexofenadine (Allegra) 180 mg tablet    Wheezing due to allergy        Relevant Medications    predniSONE (Deltasone) 20 mg tablet    albuterol (Ventolin HFA) 90 mcg/actuation inhaler    Skin mole        Relevant Orders    Referral to Dermatology

## 2024-05-13 DIAGNOSIS — K21.9 GASTROESOPHAGEAL REFLUX DISEASE, UNSPECIFIED WHETHER ESOPHAGITIS PRESENT: ICD-10-CM

## 2024-05-13 RX ORDER — PANTOPRAZOLE SODIUM 40 MG/1
TABLET, DELAYED RELEASE ORAL
Qty: 90 TABLET | Refills: 1 | Status: SHIPPED | OUTPATIENT
Start: 2024-05-13

## 2024-05-23 ENCOUNTER — APPOINTMENT (OUTPATIENT)
Dept: OBSTETRICS AND GYNECOLOGY | Facility: CLINIC | Age: 39
End: 2024-05-23
Payer: COMMERCIAL

## 2024-06-05 ENCOUNTER — NUTRITION (OUTPATIENT)
Dept: PRIMARY CARE | Facility: CLINIC | Age: 39
End: 2024-06-05
Payer: COMMERCIAL

## 2024-06-05 DIAGNOSIS — Z71.3 DIETARY COUNSELING AND SURVEILLANCE: Primary | ICD-10-CM

## 2024-06-05 DIAGNOSIS — E66.1 CLASS 3 DRUG-INDUCED OBESITY WITH BODY MASS INDEX (BMI) OF 45.0 TO 49.9 IN ADULT, UNSPECIFIED WHETHER SERIOUS COMORBIDITY PRESENT (MULTI): ICD-10-CM

## 2024-06-05 PROCEDURE — 97803 MED NUTRITION INDIV SUBSEQ: CPT

## 2024-06-05 NOTE — PROGRESS NOTES
Nutrition: Follow-up     Reason for Nutrition Visit: Patient is a 38 y.o. female referred for obesity. Referred on 3/7/24 by Dr. More Wise. Per chart review, pt has h/o gallstones.       Nutrition Assessment    Food & Nutrition Related History: Pt presents in office with her  for visit #2.     Allergies: blue cheese  Intolerance: None  Appetite: Good  Intake: >75%  GI Symptoms : reflux with fatty foods   Swallowing Difficulty: No problems with swallowing  Dentition : own  Food Preparation: Patient  Cooking Skills/Barriers: None reported  Grocery Shopping: Patient  - [employer], goes grocery shopping 1x per week   Supplements: prenatal daily  Food Insecurity: Denies   [place] (1x per week), chicken, pork (1x per week), beef (1x per week)   Sides - quinoa, rice, vegetables     Dietary Recall:   Meal 1: Eggs with tatsoi and feta, whole grain toast, vanilla Greek yogurt, cold brew coffee with half and sugar   Snacks: trail mix, mixed nuts, baby bells  Meal 2: left over stromboli   Dinner: chicken thighs, home made queso, and white rice    Snacks: walnuts, cheese crackers, fruit   Beverages: water, coffee with sugar and cream   Eating out: 1x per week is typical   Alcohol Intake: none  Self-Identified Challenges: had a negative relationship with food in the past, met with therapist     Physical Activity: Going for daily walks     Labs:  Lab Results   Component Value Date    HGBA1C 5.2 01/03/2023    HGBA1C 5.4 04/28/2022    HGBA1C 5.3 03/16/2022     01/03/2023    K 4.7 01/03/2023     01/03/2023    CO2 26 01/03/2023    BUN 17 01/03/2023    CREATININE 0.73 01/03/2023    CALCIUM 9.9 01/03/2023    ALBUMIN 4.3 01/03/2023    PROT 6.7 01/03/2023    BILITOT 0.4 01/03/2023    ALKPHOS 104 01/03/2023    ALT 28 01/03/2023    AST 17 01/03/2023    GLUCOSE 86 01/03/2023    CHOL 174 01/03/2023    TRIG 68 09/10/2021    HDL 61.0 01/03/2023   Comments: Last vitamin D = normal (5/1/24). Lipids = normal  "(5/1/24). A1c = normal (5/1/24). BMP = normal (5/1/24).       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    Chronic fatigue    Chronic hypertension in pregnancy ([facility])    Dermatographism    Edema, leg    Elderly multigravida with antepartum condition or complication ([facility])    Female infertility    Hearing loss    History of vitamin D deficiency    Lactation disorder ([facility])    Mixed conductive and sensorineural hearing loss of right ear with unrestricted hearing of left ear    Drug-induced obesity    PCOS (polycystic ovarian syndrome)    Postpartum depression    High risk multigravida in third trimester ([facility])    Primigravida of advanced maternal age in third trimester ([facility])    Recurrent acute serous otitis media of right ear    Allergic rhinitis    Gastroesophageal reflux disease    Epigastric pain    Vomiting without nausea    Gall bladder stones        Anthropometrics:  Ht Readings from Last 1 Encounters:   05/01/24 1.702 m (5' 7\")     BMI Readings from Last 1 Encounters:   05/02/24 45.01 kg/m²     Wt Readings from Last 10 Encounters:   05/02/24 130 kg (287 lb 5.9 oz)   03/18/24 127 kg (279 lb)   03/01/24 124 kg (273 lb 11.2 oz)   01/30/24 126 kg (277 lb 9.6 oz)   12/13/23 114 kg (252 lb 6 oz)   12/07/23 124 kg (274 lb)   10/09/23 128 kg (282 lb)   09/13/23 125 kg (276 lb 4 oz)   06/15/23 127 kg (280 lb)   04/04/23 125 kg (275 lb 1.6 oz)     No updates since last visit    Estimated Nutrition Needs:    Total Energy Estimated Needs (kCal): 1875 kCal   Method for Estimating Needs: MSJ 1983 x 1.2 - 500 (for weight loss)   Total " Protein Estimated Needs (g): 100 g   Total Protein Estimated Needs (g/kg): 0.8 g/kg    Nutrition Diagnosis     Patient has Nutrition Diagnosis: Yes Diagnosis Status (1): Ongoing   Nutrition Diagnosis 1: Food and nutrition related knowledge deficit Related to (1): limited prior nutrition education on general nutrition topics (meal planning) provided by a nutrition professional As Evidenced by (1): patient's desire to learn, patient's questions       Nutrition Interventions/Recommendations   FOOD & NUTRIENT DELIVERY: Decreased Fat Diet, Decreased Sodium Diet, Increased Fiber Diet, and Low Saturated Fat Diet    NUTRITION EDUCATION:   Provided education on general nutrition topics, such as heart healthy diet and managing blood sugar levels.   Spent time meal planning.     Educational Handouts: Meal Plan    *Patient expressed understanding of the education provided and denied any additional questions/concerns.       Nutrition Monitoring and Evaluation   Nutrition Goals : Consistent meal/snack pattern - met, ongoing   Increase awareness and respond to hunger cues - met, ongoing   Increase awareness and respond to satiety cues - met, ongoing   Reduce high fat foods - met, ongoing     Readiness to Change : Excellent  Level of Understanding : Excellent  Anticipated Compliant : Excellent     Follow-up: 8 weeks

## 2024-06-20 ENCOUNTER — APPOINTMENT (OUTPATIENT)
Dept: OBSTETRICS AND GYNECOLOGY | Facility: CLINIC | Age: 39
End: 2024-06-20
Payer: COMMERCIAL

## 2024-06-20 VITALS
DIASTOLIC BLOOD PRESSURE: 84 MMHG | BODY MASS INDEX: 45.2 KG/M2 | HEIGHT: 67 IN | SYSTOLIC BLOOD PRESSURE: 122 MMHG | WEIGHT: 288 LBS

## 2024-06-20 DIAGNOSIS — Z01.419 WELL WOMAN EXAM WITH ROUTINE GYNECOLOGICAL EXAM: Primary | ICD-10-CM

## 2024-06-20 DIAGNOSIS — Z30.011 ENCOUNTER FOR INITIAL PRESCRIPTION OF CONTRACEPTIVE PILLS: ICD-10-CM

## 2024-06-20 PROBLEM — O09.513 PRIMIGRAVIDA OF ADVANCED MATERNAL AGE IN THIRD TRIMESTER (HHS-HCC): Status: RESOLVED | Noted: 2023-09-02 | Resolved: 2024-06-20

## 2024-06-20 PROBLEM — O09.529 ELDERLY MULTIGRAVIDA WITH ANTEPARTUM CONDITION OR COMPLICATION (HHS-HCC): Status: RESOLVED | Noted: 2023-09-02 | Resolved: 2024-06-20

## 2024-06-20 PROBLEM — O10.919 CHRONIC HYPERTENSION IN PREGNANCY (HHS-HCC): Status: RESOLVED | Noted: 2023-09-02 | Resolved: 2024-06-20

## 2024-06-20 PROBLEM — O03.4 INCOMPLETE SPONTANEOUS ABORTION (HHS-HCC): Status: RESOLVED | Noted: 2024-06-20 | Resolved: 2024-06-20

## 2024-06-20 PROBLEM — O09.43 HIGH RISK MULTIGRAVIDA IN THIRD TRIMESTER (HHS-HCC): Status: RESOLVED | Noted: 2023-09-02 | Resolved: 2024-06-20

## 2024-06-20 PROBLEM — I10 CHRONIC HYPERTENSION: Status: ACTIVE | Noted: 2024-06-20

## 2024-06-20 PROBLEM — B49 INFECTION DUE TO FUNGUS: Status: ACTIVE | Noted: 2024-06-20

## 2024-06-20 PROBLEM — O35.BXX0 ABNORMAL FETAL ECHOCARDIOGRAM AFFECTING ANTEPARTUM CARE OF MOTHER (HHS-HCC): Status: RESOLVED | Noted: 2023-09-02 | Resolved: 2024-06-20

## 2024-06-20 PROBLEM — E66.01 MORBID (SEVERE) OBESITY DUE TO EXCESS CALORIES (MULTI): Status: ACTIVE | Noted: 2022-11-15

## 2024-06-20 PROBLEM — E66.01 SEVERE OBESITY (MULTI): Status: ACTIVE | Noted: 2023-09-02

## 2024-06-20 PROBLEM — E28.2 POLYCYSTIC OVARY SYNDROME: Status: ACTIVE | Noted: 2022-03-10

## 2024-06-20 PROCEDURE — 99395 PREV VISIT EST AGE 18-39: CPT | Performed by: OBSTETRICS & GYNECOLOGY

## 2024-06-20 PROCEDURE — 3008F BODY MASS INDEX DOCD: CPT | Performed by: OBSTETRICS & GYNECOLOGY

## 2024-06-20 PROCEDURE — 3074F SYST BP LT 130 MM HG: CPT | Performed by: OBSTETRICS & GYNECOLOGY

## 2024-06-20 PROCEDURE — 3079F DIAST BP 80-89 MM HG: CPT | Performed by: OBSTETRICS & GYNECOLOGY

## 2024-06-20 PROCEDURE — 1036F TOBACCO NON-USER: CPT | Performed by: OBSTETRICS & GYNECOLOGY

## 2024-06-20 RX ORDER — NORETHINDRONE 0.35 MG/1
1 TABLET ORAL DAILY
Qty: 84 TABLET | Refills: 3 | Status: SHIPPED | OUTPATIENT
Start: 2024-06-20 | End: 2025-06-20

## 2024-06-20 NOTE — PROGRESS NOTES
Subjective   Patient ID: Melanie Walsh is a 38 y.o. female who presents for No chief complaint on file..  Pap 9/2021 WNL.  Has been trying to decide whether to attempt another pregnancy. Interested in contraception at this time. Currently using timed intercourse.   Still breastfeeding 2-3 times/day.  Will start on a POP and convert to ALBERTO when weaning.     Review of Systems   All other systems reviewed and are negative.    Objective   Physical Exam  Vitals reviewed.   Constitutional:       Appearance: Normal appearance.   HENT:      Head: Normocephalic and atraumatic.      Right Ear: External ear normal.      Left Ear: External ear normal.      Nose: Nose normal.      Mouth/Throat:      Mouth: Mucous membranes are moist.   Eyes:      Extraocular Movements: Extraocular movements intact.   Neck:      Thyroid: No thyroid mass or thyromegaly.   Cardiovascular:      Rate and Rhythm: Normal rate and regular rhythm.      Heart sounds: Normal heart sounds.   Pulmonary:      Effort: Pulmonary effort is normal.      Breath sounds: Normal breath sounds.   Chest:   Breasts:     Right: Normal.      Left: Normal.   Abdominal:      General: Abdomen is flat. Bowel sounds are normal.      Palpations: Abdomen is soft.      Tenderness: There is no abdominal tenderness. There is no right CVA tenderness or left CVA tenderness.   Genitourinary:     General: Normal vulva.      Exam position: Lithotomy position.      Labia:         Right: No lesion.         Left: No lesion.       Urethra: No prolapse, urethral swelling or urethral lesion.      Vagina: Normal.      Cervix: Normal.      Uterus: Normal.       Adnexa: Right adnexa normal and left adnexa normal.   Musculoskeletal:         General: Normal range of motion.      Right shoulder: Normal.      Left shoulder: Normal.      Cervical back: Normal, normal range of motion and neck supple.      Thoracic back: Normal.      Lumbar back: Normal.      Right hip: Normal.      Left hip:  Normal.      Right upper leg: Normal.      Left upper leg: Normal.      Right knee: Normal.      Left knee: Normal.      Right lower leg: Normal.      Left lower leg: Normal.   Lymphadenopathy:      Upper Body:      Right upper body: No axillary adenopathy.      Left upper body: No axillary adenopathy.      Lower Body: No right inguinal adenopathy. No left inguinal adenopathy.   Skin:     General: Skin is warm and dry.   Neurological:      General: No focal deficit present.      Mental Status: She is alert and oriented to person, place, and time.      Cranial Nerves: Cranial nerves 2-12 are intact.      Motor: Motor function is intact.   Psychiatric:         Attention and Perception: Attention and perception normal.         Mood and Affect: Mood and affect normal.         Behavior: Behavior normal.         Cognition and Memory: Cognition normal.         Judgment: Judgment normal.     Assessment/Plan   Problem List Items Addressed This Visit    None  Visit Diagnoses         Codes    Well woman exam with routine gynecological exam    -  Primary Z01.419    Encounter for initial prescription of contraceptive pills     Z30.011    Relevant Medications    norethindrone (Micronor) 0.35 mg tablet          Veronica Parrish MD 06/20/24 12:23 PM

## 2024-07-23 ENCOUNTER — APPOINTMENT (OUTPATIENT)
Dept: DERMATOLOGY | Facility: CLINIC | Age: 39
End: 2024-07-23
Payer: COMMERCIAL

## 2024-07-23 DIAGNOSIS — D22.9 COMPOUND NEVUS: ICD-10-CM

## 2024-07-23 DIAGNOSIS — L82.1 SEBORRHEIC KERATOSES: Primary | ICD-10-CM

## 2024-07-23 PROCEDURE — 99203 OFFICE O/P NEW LOW 30 MIN: CPT

## 2024-07-23 ASSESSMENT — PATIENT GLOBAL ASSESSMENT (PGA): WHAT IS THE PGA: PATIENT GLOBAL ASSESSMENT:  1 - CLEAR

## 2024-07-23 ASSESSMENT — DERMATOLOGY QUALITY OF LIFE (QOL) ASSESSMENT
RATE HOW BOTHERED YOU ARE BY SYMPTOMS OF YOUR SKIN PROBLEM (EG, ITCHING, STINGING BURNING, HURTING OR SKIN IRRITATION): 0 - NEVER BOTHERED
RATE HOW BOTHERED YOU ARE BY EFFECTS OF YOUR SKIN PROBLEMS ON YOUR ACTIVITIES (EG, GOING OUT, ACCOMPLISHING WHAT YOU WANT, WORK ACTIVITIES OR YOUR RELATIONSHIPS WITH OTHERS): 0 - NEVER BOTHERED
RATE HOW BOTHERED YOU ARE BY SYMPTOMS OF YOUR SKIN PROBLEM (EG, ITCHING, STINGING BURNING, HURTING OR SKIN IRRITATION): 0 - NEVER BOTHERED
RATE HOW EMOTIONALLY BOTHERED YOU ARE BY YOUR SKIN PROBLEM (FOR EXAMPLE, WORRY, EMBARRASSMENT, FRUSTRATION): 0 - NEVER BOTHERED
RATE HOW BOTHERED YOU ARE BY EFFECTS OF YOUR SKIN PROBLEMS ON YOUR ACTIVITIES (EG, GOING OUT, ACCOMPLISHING WHAT YOU WANT, WORK ACTIVITIES OR YOUR RELATIONSHIPS WITH OTHERS): 0 - NEVER BOTHERED
RATE HOW EMOTIONALLY BOTHERED YOU ARE BY YOUR SKIN PROBLEM (FOR EXAMPLE, WORRY, EMBARRASSMENT, FRUSTRATION): 0 - NEVER BOTHERED
WHAT SINGLE SKIN CONDITION LISTED BELOW IS THE PATIENT ANSWERING THE QUALITY-OF-LIFE ASSESSMENT QUESTIONS ABOUT: NONE OF THE ABOVE
WHAT SINGLE SKIN CONDITION LISTED BELOW IS THE PATIENT ANSWERING THE QUALITY-OF-LIFE ASSESSMENT QUESTIONS ABOUT: NONE OF THE ABOVE

## 2024-07-29 NOTE — PROGRESS NOTES
AUDIOLOGY ADULT AUDIOMETRIC EVALUATION      Name:  Melanie Walsh   :  1985  Age:  38 y.o.  Date of Evaluation:  2024    Time: 0250-3134    IMPRESSIONS     Essentially mild sensorineural hearing loss rising to normal hearing sensitivity at 4000 Hz sloping to mild hearing loss in the right ear, and essentially normal hearing sensitivity in the left ear.  Word understanding in quiet is good in the right ear, and excellent in the left ear.  Tympanometry indicates large ear canal volume and negative pressure in the right ear, and middle ear pressure and eardrum compliance within normal limits in the left ear.     Amplification needs: Continue full-time use of right ear hearing device, expect when activities preclude device safety.    RECOMMENDATIONS     Continue medical follow up with Ears Nose and Throat (ENT) provider as recommended.  Return for audiologic evaluation annually to monitor hearing sensitivity and assess middle ear status or sooner should concerns arise. The audiology department can be reached at [phone] to schedule an appointment.   Strive for full-time device use during waking hours.  Avoid exposure to loud sounds by moving away from the noise, turning down the volume, or wearing proper hearing protection correctly.    HISTORY     Reason for visit:  Ms. Walsh is seen today for a follow-up audiologic evaluation in conjunction with an evaluation with Dr. Daly due to known hearing loss in the right ear. Previous audio was performed on 1/3/2024 and revealed essentially moderate mixed hearing loss recovering to normal at 4000 Hz falling to mild at 8000 Hz for the right ear, and hearing levels within normal limits 125 Hz through 8000 Hz in the left ear. History obtained from patient report and chart review.     Change in Hearing: denied  Tinnitus: denied   Otalgia: denied  Aural Pressure/Fullness: denied  Recent Ear Infections/Illness: denied  Otorrhea: denied  Dizziness:  "denied  Hearing Aid Use: Yes, wears a hearing aid in the right ear only in difficult listening environments  Other Significant History: denied    Recall:  Patient has history of cleft palate and long standing issues with hearing loss in the right ear. She has undergone multiple sets of tubes on both sides, and has had a previous right-sided tympanoplasty.     EVALUATION     See scanned audiogram in \"Media\"     TEST RESULTS     Otoscopic Evaluation:  Right Ear: Surgical ear, flakiness in ear canal   Left Ear: Ear canal clear, tympanic membrane visualized.    Tympanometry (226 Hz):  Right Ear: Type C/B, negative middle ear pressure (-256 daPa) and normal eardrum mobility. Large ear canal volume, consistent with tympanic membrane perforation  Left Ear: Type A, middle ear pressure and tympanic membrane compliance within normal limits.     Acoustic Reflexes:   Right Ear: Could not test due to type B immittance result.  Left Ear: (Ipsilateral) Responses present at expected levels for 500-4000 Hz.    Test technique:  Pure Tone Audiometry via headphones  Reliability: Good    Pure Tone Audiometry:    Right Ear: Mild sensorineural hearing loss 125-1000 Hz sloping to moderate sensorineural hearing loss at 2000 Hz rising to normal hearing sensitivity at 4000 Hz and sloping down to mild hearing loss from 5871-7935 Hz.   Left Ear: Hearing sensitivity within normal limits for 125-8000 Hz    Speech Audiometry:   Right Ear:  Speech Reception Threshold (SRT) was obtained at 30 dB HL. Word Recognition scores were good (84%) in quiet when words were presented at 75 dB HL. These results are based on [facility] Auditory Test No.6 (NU-6) (N=25).   Left Ear:  Speech Reception Threshold (SRT) was obtained at 5 dB HL. Word Recognition scores were excellent (96%) in quiet when words were presented at 50 dB HL. These results are based on [facility] Auditory Test No.6 (NU-6) (N=25).     Comparison of today's results " with previous test results: Hearing levels progressed in right ear bone conduction only since last evaluation on 1/3/2024.         PATIENT EDUCATION     Discussed results and recommendations with Ms. Walsh. Questions were addressed and the patient was encouraged to contact our department at [phone] should concerns arise.     SHARRI Siddiqi.  Audiology Graduate Clinician    Rafiq Foreman, CCC-A  Clinical Audiologist    Degree of   Hearing Sensitivity dB Range   Within Normal Limits (WNL) 0 - 20   Slight 25   Mild 26 - 40   Moderate 41 - 55   Moderately-Severe 56 - 70   Severe 71 - 90   Profound 91 +     Key   CHL Conductive Hearing Loss   ECV Ear Canal Volume   HA Hearing Aid   NIHL Noise-Induced Hearing Loss   PTA Pure Tone Average   SNHL Sensorineural Hearing Loss   TM Tympanic Membrane   WNL Within Normal Limits

## 2024-07-30 ENCOUNTER — APPOINTMENT (OUTPATIENT)
Dept: OTOLARYNGOLOGY | Facility: CLINIC | Age: 39
End: 2024-07-30
Payer: COMMERCIAL

## 2024-07-30 ENCOUNTER — CLINICAL SUPPORT (OUTPATIENT)
Dept: AUDIOLOGY | Facility: CLINIC | Age: 39
End: 2024-07-30
Payer: COMMERCIAL

## 2024-07-30 ENCOUNTER — APPOINTMENT (OUTPATIENT)
Dept: AUDIOLOGY | Facility: CLINIC | Age: 39
End: 2024-07-30
Payer: COMMERCIAL

## 2024-07-30 VITALS — HEIGHT: 67 IN | WEIGHT: 285.9 LBS | BODY MASS INDEX: 44.87 KG/M2 | TEMPERATURE: 98.1 F

## 2024-07-30 DIAGNOSIS — H69.91 TYPE C TYMPANOGRAM OF RIGHT EAR: ICD-10-CM

## 2024-07-30 DIAGNOSIS — Q37.9 CLEFT PALATE WITH CLEFT LIP (HHS-HCC): ICD-10-CM

## 2024-07-30 DIAGNOSIS — H72.01 CENTRAL PERFORATION OF TYMPANIC MEMBRANE OF RIGHT EAR: Primary | ICD-10-CM

## 2024-07-30 DIAGNOSIS — H90.71 MIXED CONDUCTIVE AND SENSORINEURAL HEARING LOSS OF RIGHT EAR WITH UNRESTRICTED HEARING OF LEFT EAR: ICD-10-CM

## 2024-07-30 DIAGNOSIS — H90.41 SENSORINEURAL HEARING LOSS (SNHL) OF RIGHT EAR WITH UNRESTRICTED HEARING OF LEFT EAR: Primary | ICD-10-CM

## 2024-07-30 PROCEDURE — 92550 TYMPANOMETRY & REFLEX THRESH: CPT | Performed by: AUDIOLOGIST

## 2024-07-30 PROCEDURE — 92557 COMPREHENSIVE HEARING TEST: CPT | Performed by: AUDIOLOGIST

## 2024-07-30 PROCEDURE — 1036F TOBACCO NON-USER: CPT | Performed by: STUDENT IN AN ORGANIZED HEALTH CARE EDUCATION/TRAINING PROGRAM

## 2024-07-30 PROCEDURE — 3008F BODY MASS INDEX DOCD: CPT | Performed by: STUDENT IN AN ORGANIZED HEALTH CARE EDUCATION/TRAINING PROGRAM

## 2024-07-30 PROCEDURE — 99213 OFFICE O/P EST LOW 20 MIN: CPT | Performed by: STUDENT IN AN ORGANIZED HEALTH CARE EDUCATION/TRAINING PROGRAM

## 2024-07-31 NOTE — PROGRESS NOTES
CHIEF COMPLAINT: Right TM perforation     HISTORY OF PRESENT ILLNESS from 2024: Ms. Walsh is a 38-year-old woman with a history of cleft palate and long standing issues with hearing loss in the right ear. She has undergone multiple sets of tubes on both sides, and has had a previous right-sided tympanoplasty. She has no pain or complaints regarding her ear. She was last seen by Dr. Daly almost a year ago, where she was found to have a small residual perforation on the right. She was not interested in tympanoplasty at that time however she was interested in hearing amplification. No changes to hearing in the interim. Denies drainage, has no issue keeping the ear dry. She was able to get hearing aids through [employer] but would like a new prescription and medical clearance for that.     Interval History: She denies significant change in her hearing or drainage.      PAST MEDICAL HISTORY:   Past Medical History:   Diagnosis Date    Candidiasis, unspecified 2022    Yeast infection    Delivery by elective  section ([facility]) 2024    Encounter for immunization     COVID-19 vaccine administered    Incomplete spontaneous  ([facility]) 2024    Personal history of other complications of pregnancy, childbirth and the puerperium 2020    History of spontaneous     Personal history of other diseases of the circulatory system 2022    History of hypertension    Varicella without complication     Varicella without complication       PAST SURGICAL HISTORY:   Past Surgical History:   Procedure Laterality Date    OTHER SURGICAL HISTORY  2016    Palatoplasty For Cleft Palate    OTHER SURGICAL HISTORY  12/15/2022     section    OTHER SURGICAL HISTORY  2022    Cleft palate repair       MEDICATIONS:   Current Outpatient Medications:     albuterol (Ventolin HFA) 90 mcg/actuation inhaler, Inhale 2 puffs every 4 hours if needed for wheezing or  "shortness of breath., Disp: 8 g, Rfl: 5    cetirizine (ZYRTEC) 10 mg capsule, Take by mouth early in the morning.., Disp: , Rfl:     cholecalciferol, vitamin D3, (VITAMIN D3 ORAL), Take by mouth., Disp: , Rfl:     EPINEPHrine (Epipen) 0.3 mg/0.3 mL injection syringe, Inject 0.3 mL (0.3 mg) into the muscle if needed for anaphylaxis. Inject into UPPER, OUTER THIGH. Call 911 after use., Disp: 2 each, Rfl: 3    EPINEPHrine 0.3 mg/0.3 mL injection syringe, Inject 0.3 mL (0.3 mg) into the muscle., Disp: , Rfl:     labetalol (Normodyne) 200 mg tablet, Take 1 tablet (200 mg) by mouth every 12 hours., Disp: 90 tablet, Rfl: 2    norethindrone (Micronor) 0.35 mg tablet, Take 1 tablet (0.35 mg) over 28 days by mouth once daily., Disp: 84 tablet, Rfl: 3    pantoprazole (ProtoNix) 40 mg EC tablet, TAKE 1 TABLET(40 MG) BY MOUTH EVERY DAY. DO NOT CRUSH, CHEW, OR SPLIT. TAKE 30 MINUTES BEFORE A MEAL, Disp: 90 tablet, Rfl: 1    prenatal no115/iron/folic acid (PRENATAL 19 ORAL), Take by mouth., Disp: , Rfl:     fexofenadine (Allegra) 180 mg tablet, Take 1 tablet (180 mg) by mouth once daily. (Patient not taking: Reported on 7/30/2024), Disp: 30 tablet, Rfl: 11    ALLERGIES:   Allergies   Allergen Reactions    Cayenne Anaphylaxis    Mold Anaphylaxis    Ragweed Pollen Anaphylaxis    Penicillin V Hives       SOCIAL HISTORY:   reports that she has never smoked. She has never used smokeless tobacco. She reports that she does not currently use alcohol. She reports that she does not use drugs.    FAMILY HISTORY: family history includes Diabetes in her father and mother; Osteoarthritis in her mother.    PHYSICAL EXAM:    VITALS:   Vitals:    07/30/24 1023   Temp: 36.7 °C (98.1 °F)   Weight: 130 kg (285 lb 14.4 oz)   Height: 1.702 m (5' 7\")        GENERAL: healthy, alert, well developed, well nourished, no distress, cooperative, appears chronologic age    Communicates with normal voice and without hearing aids.    HEAD: atraumatic, " normocephalic, no lesions    EYES: normal, PERRLA and EOM's intact    EARS:   Right ear demonstrates a cerumen impaction which was removed with curette and #7 suction revealing a small 20% perforation of the drum with no debris anterior quadrant with tympanosclerosis, but no drainage  Left ear: demonstrates a patent external auditory canal with a normal tympanic membrane.       NOSE: nose shows no deformity, asymmetry, or inflammation, nasal mucosa normal    ORAL CAVITY/OROPHARYNX: negative findings: lips normal without lesions, buccal mucosa normal, gums healthy, teeth intact, non-carious, palate normal, tongue midline and normal, soft palate, uvula, and tonsils normal    NECK AND SALIVARY GLANDS: Neck is supple without masses or lymphadenopathy. Palpation of the parotid and submandibular gland reveals no masses or tenderness to palpation.    CRANIAL NERVES: intact,   Facial nerve exam  is House - Brackmann 1- Normal Function on the right and 1- Normal Function on the left.    CARDIOVASCULAR: No evidence of peripheral edema    PULMONARY: normal lung excursion, no evidence of retractions    DATA REVIEWED:  Audiogram  I reviewed the audiogram from 1/30/2024, which demonstrates right moderate mixed hearing loss with a type B tympanogram and left normal hearing    I reviewed the audiogram from 7/30/2024, which demonstrates left normal hearing and right mild mixed hearing loss.  There is a type A tympanogram on the left and a type C tympanogram on the right.    I reviewed the MRI IAC w/ and w/o contrast, both the images and the report, which did not demonstrate any evidence of retrocochlear pathology.    IMPRESSION:   1) This is a 38 year old female with a history of right tympanoplasty and residual perforation. She is not interested in tympanoplasty at this time.  There is anterior superior retraction with a small amount of debris but no drainage.  I think we may continue to observe this. Her audiogram today is stable  from last.     PLAN:  1) RTC in 6 months for continued observation   2) continue dry ear precautions, RTC sooner if there is an abrupt loss of hearing or drainage      John Daly MD

## 2024-08-22 ENCOUNTER — APPOINTMENT (OUTPATIENT)
Dept: PRIMARY CARE | Facility: CLINIC | Age: 39
End: 2024-08-22
Payer: COMMERCIAL

## 2024-08-22 DIAGNOSIS — E66.01 SEVERE OBESITY (MULTI): ICD-10-CM

## 2024-08-22 DIAGNOSIS — Z71.3 DIETARY COUNSELING AND SURVEILLANCE: Primary | ICD-10-CM

## 2024-08-22 PROCEDURE — 97803 MED NUTRITION INDIV SUBSEQ: CPT

## 2024-08-22 NOTE — PROGRESS NOTES
Nutrition: Follow-up     Reason for Nutrition Visit: Patient is a 38 y.o. female referred for obesity. Referred on 3/7/24 by Dr. More Wise. Per chart review, pt has h/o gallstones.       Nutrition Assessment    Food & Nutrition Related History: Pt presents in office with her  for visit 3. Signed up for [employer]. Struggling with preparing lunches and lower carb baking.     Allergies: blue cheese  Intolerance: None  Appetite: Good  Intake: >75%  GI Symptoms : reflux with fatty foods   Swallowing Difficulty: No problems with swallowing  Dentition : own  Food Preparation: Patient  Cooking Skills/Barriers: None reported  Grocery Shopping: Patient  - [employer], goes grocery shopping 1x per week   Supplements: prenatal daily  Food Insecurity: Denies   [place] (1x per week), chicken, pork (1x per week), beef (1x per week)   Sides - quinoa, rice, vegetables     Dietary Recall:   Meal 1: Eggs with tatsoi and feta, whole grain toast, vanilla Greek yogurt, cold brew coffee with half and sugar   Snacks: trail mix, mixed nuts, baby bells  Meal 2: left over stromboli   Dinner: chicken thighs, home made queso, and white rice    Snacks: walnuts, cheese crackers, fruit   Beverages: water, coffee with sugar and cream   Eating out: 1x per week is typical   Alcohol Intake: none  Self-Identified Challenges: negative relationship with food in the past, met with therapist     Physical Activity: Going for daily walks     Labs:  Lab Results   Component Value Date    HGBA1C 5.2 01/03/2023    HGBA1C 5.4 04/28/2022    HGBA1C 5.3 03/16/2022     01/03/2023    K 4.7 01/03/2023     01/03/2023    CO2 26 01/03/2023    BUN 17 01/03/2023    CREATININE 0.73 01/03/2023    CALCIUM 9.9 01/03/2023    ALBUMIN 4.3 01/03/2023    PROT 6.7 01/03/2023    BILITOT 0.4 01/03/2023    ALKPHOS 104 01/03/2023    ALT 28 01/03/2023    AST 17 01/03/2023    GLUCOSE 86 01/03/2023    CHOL 174 01/03/2023    TRIG 68 09/10/2021    HDL 61.0  "01/03/2023   Comments: Last vitamin D = normal (5/1/24). Lipids = normal (5/1/24). A1c = normal (5/1/24). BMP = normal (5/1/24).       Nutrition Focused Physical Exam:  Performed/Deferred: Deferred as pt visually appears well-nourished with no signs of malnutrition      Past Medical History:  Patient Active Problem List   Diagnosis    Abnormal fetal echocardiogram affecting antepartum care of mother (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)    Dermatographism    Edema, leg    Elderly multigravida with antepartum condition or complication ([facility])    Female infertility    Hearing loss    History of vitamin D deficiency    Lactation disorder ([facility])    Mixed conductive and sensorineural hearing loss of right ear with unrestricted hearing of left ear    Drug-induced obesity    PCOS (polycystic ovarian syndrome)    Postpartum depression    High risk multigravida in third trimester ([facility])    Primigravida of advanced maternal age in third trimester ([facility])    Recurrent acute serous otitis media of right ear    Allergic rhinitis    Gastroesophageal reflux disease    Epigastric pain    Vomiting without nausea    Gall bladder stones        Anthropometrics:  Ht Readings from Last 1 Encounters:   05/01/24 1.702 m (5' 7\")     BMI Readings from Last 1 Encounters:   07/30/24 44.78 kg/m²     Wt Readings from Last 10 Encounters:   07/30/24 130 kg (285 lb 14.4 oz)   06/20/24 131 kg (288 lb)   05/02/24 130 kg (287 lb 5.9 oz)   03/18/24 127 kg (279 lb)   03/01/24 124 kg (273 lb 11.2 oz)   01/30/24 126 kg (277 lb 9.6 oz)   12/13/23 114 kg (252 lb 6 oz)   12/07/23 124 kg (274 lb)   10/09/23 128 kg (282 lb)   09/13/23 125 kg (276 lb 4 oz)       Estimated Nutrition Needs:    Total Energy Estimated Needs (kCal): 1875 kCal   Method for Estimating Needs: MSMOOKIE 1983 " x 1.2 - 500 (for weight loss)   Total Protein Estimated Needs (g): 100 g   Total Protein Estimated Needs (g/kg): 0.8 g/kg    Nutrition Diagnosis     Patient has Nutrition Diagnosis: Yes Diagnosis Status (1): Ongoing   Nutrition Diagnosis 1: Food and nutrition related knowledge deficit Related to (1): limited prior nutrition education on general nutrition topics (meal planning) provided by a nutrition professional As Evidenced by (1): patient's desire to learn, patient's questions       Nutrition Interventions/Recommendations   FOOD & NUTRIENT DELIVERY: Decreased Fat Diet, Decreased Sodium Diet, Increased Fiber Diet, and Low Saturated Fat Diet    NUTRITION EDUCATION:   Discussed using non-nutritive sweeteners when baking; Provided list of sugars to be aware of when baking or purchasing food.   Discussed preparing lunch the night before.     Educational Handouts: Meal Plan    *Patient expressed understanding of the education provided and denied any additional questions/concerns.       Nutrition Monitoring and Evaluation   Nutrition Goals : Consistent meal/snack pattern - met, ongoing   Increase awareness and respond to hunger cues - met, ongoing   Increase awareness and respond to satiety cues - met, ongoing     Readiness to Change : Excellent  Level of Understanding : Excellent  Anticipated Compliant : Excellent     Follow-up: 3-4 months

## 2024-08-23 ENCOUNTER — APPOINTMENT (OUTPATIENT)
Dept: PRIMARY CARE | Facility: CLINIC | Age: 39
End: 2024-08-23
Payer: COMMERCIAL

## 2024-08-26 ENCOUNTER — OFFICE VISIT (OUTPATIENT)
Dept: PRIMARY CARE | Facility: CLINIC | Age: 39
End: 2024-08-26
Payer: COMMERCIAL

## 2024-08-26 VITALS
RESPIRATION RATE: 18 BRPM | TEMPERATURE: 96.8 F | HEART RATE: 89 BPM | SYSTOLIC BLOOD PRESSURE: 132 MMHG | WEIGHT: 286 LBS | DIASTOLIC BLOOD PRESSURE: 78 MMHG | OXYGEN SATURATION: 97 % | HEIGHT: 67 IN | BODY MASS INDEX: 44.89 KG/M2

## 2024-08-26 ASSESSMENT — PAIN SCALES - GENERAL: PAINLEVEL: 0-NO PAIN

## 2024-08-26 ASSESSMENT — PATIENT HEALTH QUESTIONNAIRE - PHQ9
2. FEELING DOWN, DEPRESSED OR HOPELESS: NOT AT ALL
SUM OF ALL RESPONSES TO PHQ9 QUESTIONS 1 AND 2: 0
1. LITTLE INTEREST OR PLEASURE IN DOING THINGS: NOT AT ALL

## 2024-10-10 ENCOUNTER — APPOINTMENT (OUTPATIENT)
Dept: DERMATOLOGY | Facility: CLINIC | Age: 39
End: 2024-10-10
Payer: COMMERCIAL

## 2024-11-04 ENCOUNTER — OFFICE VISIT (OUTPATIENT)
Dept: PRIMARY CARE | Facility: CLINIC | Age: 39
End: 2024-11-04
Payer: COMMERCIAL

## 2024-11-04 VITALS
OXYGEN SATURATION: 98 % | RESPIRATION RATE: 18 BRPM | HEART RATE: 76 BPM | DIASTOLIC BLOOD PRESSURE: 74 MMHG | BODY MASS INDEX: 45.99 KG/M2 | TEMPERATURE: 97.4 F | HEIGHT: 67 IN | SYSTOLIC BLOOD PRESSURE: 110 MMHG | WEIGHT: 293 LBS

## 2024-11-04 DIAGNOSIS — T78.40XA WHEEZING DUE TO ALLERGY: ICD-10-CM

## 2024-11-04 DIAGNOSIS — R06.2 WHEEZING DUE TO ALLERGY: ICD-10-CM

## 2024-11-04 DIAGNOSIS — K21.9 GASTROESOPHAGEAL REFLUX DISEASE, UNSPECIFIED WHETHER ESOPHAGITIS PRESENT: ICD-10-CM

## 2024-11-04 DIAGNOSIS — Z23 ENCOUNTER FOR IMMUNIZATION: Primary | ICD-10-CM

## 2024-11-04 DIAGNOSIS — I10 BENIGN ESSENTIAL HYPERTENSION: ICD-10-CM

## 2024-11-04 PROBLEM — O92.70 LACTATION DISORDER (HHS-HCC): Status: RESOLVED | Noted: 2023-09-02 | Resolved: 2024-11-04

## 2024-11-04 PROBLEM — Q37.9 CLEFT PALATE WITH CLEFT LIP: Status: RESOLVED | Noted: 2023-09-02 | Resolved: 2024-11-04

## 2024-11-04 PROBLEM — E66.01 SEVERE OBESITY (MULTI): Status: RESOLVED | Noted: 2023-09-02 | Resolved: 2024-11-04

## 2024-11-04 PROBLEM — R10.13 EPIGASTRIC PAIN: Status: RESOLVED | Noted: 2023-10-09 | Resolved: 2024-11-04

## 2024-11-04 PROCEDURE — 3008F BODY MASS INDEX DOCD: CPT | Performed by: NURSE PRACTITIONER

## 2024-11-04 PROCEDURE — 90656 IIV3 VACC NO PRSV 0.5 ML IM: CPT | Performed by: NURSE PRACTITIONER

## 2024-11-04 PROCEDURE — 99214 OFFICE O/P EST MOD 30 MIN: CPT | Performed by: NURSE PRACTITIONER

## 2024-11-04 PROCEDURE — 90471 IMMUNIZATION ADMIN: CPT | Performed by: NURSE PRACTITIONER

## 2024-11-04 PROCEDURE — 3078F DIAST BP <80 MM HG: CPT | Performed by: NURSE PRACTITIONER

## 2024-11-04 PROCEDURE — 3074F SYST BP LT 130 MM HG: CPT | Performed by: NURSE PRACTITIONER

## 2024-11-04 PROCEDURE — 1036F TOBACCO NON-USER: CPT | Performed by: NURSE PRACTITIONER

## 2024-11-04 RX ORDER — ALBUTEROL SULFATE 90 UG/1
2 INHALANT RESPIRATORY (INHALATION) EVERY 4 HOURS PRN
Qty: 8 G | Refills: 5 | Status: SHIPPED | OUTPATIENT
Start: 2024-11-04 | End: 2025-11-04

## 2024-11-04 RX ORDER — LABETALOL 200 MG/1
1 TABLET, FILM COATED ORAL EVERY 12 HOURS
Qty: 90 TABLET | Refills: 2 | Status: SHIPPED | OUTPATIENT
Start: 2024-11-04

## 2024-11-04 RX ORDER — PANTOPRAZOLE SODIUM 40 MG/1
40 TABLET, DELAYED RELEASE ORAL
Qty: 90 TABLET | Refills: 1 | Status: SHIPPED | OUTPATIENT
Start: 2024-11-04 | End: 2025-05-03

## 2024-11-04 RX ORDER — BISMUTH SUBSALICYLATE 262 MG
1 TABLET,CHEWABLE ORAL DAILY
COMMUNITY

## 2024-11-04 ASSESSMENT — ENCOUNTER SYMPTOMS
RESPIRATORY NEGATIVE: 1
GASTROINTESTINAL NEGATIVE: 1
CARDIOVASCULAR NEGATIVE: 1
CONSTITUTIONAL NEGATIVE: 1
MUSCULOSKELETAL NEGATIVE: 1

## 2024-11-04 ASSESSMENT — PAIN SCALES - GENERAL: PAINLEVEL_OUTOF10: 0-NO PAIN

## 2024-11-04 NOTE — PROGRESS NOTES
"Chief Complaint  Melanie Walsh is a 39 y.o. female presenting for \"Follow-up (6 month follow up/Flu shot).\"    HPI     Melanie Walsh is a 39 y.o. female presenting for her 6-month follow-up new to me needs refills on medications would like a flu shot no questions or concerns      Past Medical History  Patient Active Problem List    Diagnosis Date Noted    Chronic hypertension 06/20/2024    Infection due to fungus 06/20/2024    Calculus of gallbladder 03/04/2024    Gastroesophageal reflux disease 10/09/2023    Epigastric pain 10/09/2023    Vomiting without nausea 10/09/2023    Abnormal tympanogram 09/02/2023    Anaphylaxis 09/02/2023    Anxiety 09/02/2023    Benign essential hypertension 09/02/2023    Central perforation of tympanic membrane of right ear 09/02/2023    Chronic fatigue 09/02/2023    Cleft palate with cleft lip 09/02/2023    Dermatographism 09/02/2023    Edema, leg 09/02/2023    Female infertility 09/02/2023    Hearing loss 09/02/2023    History of vitamin D deficiency 09/02/2023    Lactation disorder ([facility]) 09/02/2023    Mixed conductive and sensorineural hearing loss of right ear with unrestricted hearing of left ear 09/02/2023    Severe obesity ([facility]) 09/02/2023    Postpartum depression 09/02/2023    Recurrent acute serous otitis media of right ear 09/02/2023    Allergic rhinitis 09/02/2023    Morbid (severe) obesity due to excess calories ([facility]) 11/15/2022    Polycystic ovary syndrome 03/10/2022        Medications  Current Outpatient Medications   Medication Instructions    albuterol (Ventolin HFA) 90 mcg/actuation inhaler 2 puffs, inhalation, Every 4 hours PRN    cetirizine (ZYRTEC) 10 mg capsule Daily    cholecalciferol, vitamin D3, (VITAMIN D3 ORAL) Take by mouth.    EPINEPHrine (EPIPEN) 0.3 mg, intramuscular, As needed, Inject into UPPER, OUTER THIGH. Call 911 after use.    fexofenadine (ALLEGRA) 180 mg, oral, Daily    labetalol (NORMODYNE) 200 mg, oral, Every 12 hours    " multivitamin tablet 1 tablet, Daily    norethindrone (MICRONOR) 0.35 mg, oral, Daily    pantoprazole (PROTONIX) 40 mg, oral, Daily before breakfast, Do not crush, chew, or split.        Surgical History  She has a past surgical history that includes Other surgical history (2016); Other surgical history (12/15/2022); Other surgical history (2022);  section, low transverse (2022); Tympanostomy tube placement; and Rhinoplasty.     Social History  She reports that she has never smoked. She has never been exposed to tobacco smoke. She has never used smokeless tobacco. She reports that she does not drink alcohol and does not use drugs.    Family History  Family History   Problem Relation Name Age of Onset    Diabetes Mother Cynthia     Osteoarthritis Mother Cynthia     Diabetes Father Osmin     Diabetes Maternal Grandfather Sly     Diabetes Maternal Grandmother Cynthia     Hearing loss Maternal Grandmother Cynthia         Allergies  Cayenne, House dust mite, Mold, Ragweed pollen, Penicillin v, and Animal dander    ROS  Review of Systems   Constitutional: Negative.    Respiratory: Negative.     Cardiovascular: Negative.    Gastrointestinal: Negative.    Genitourinary: Negative.    Musculoskeletal: Negative.         Last Recorded Vitals  /74 (BP Location: Right arm, Patient Position: Sitting, BP Cuff Size: Adult)   Pulse 76   Temp 36.3 °C (97.4 °F)   Resp 18   Wt 135 kg (298 lb)   SpO2 98%     Physical Exam  Vitals and nursing note reviewed.   Constitutional:       Appearance: Normal appearance.   Eyes:      Pupils: Pupils are equal, round, and reactive to light.   Cardiovascular:      Rate and Rhythm: Normal rate and regular rhythm.      Pulses: Normal pulses.      Heart sounds: Normal heart sounds.   Pulmonary:      Effort: Pulmonary effort is normal.      Breath sounds: Normal breath sounds.   Neurological:      Mental Status: She is alert.         Relevant Results      Assessment/Plan    Melanie was seen today for follow-up.  Diagnoses and all orders for this visit:  Encounter for immunization (Primary)  -     Flu vaccine, trivalent, preservative free, age 6 months and greater (Fluraix/Fluzone/Flulaval)  Benign essential hypertension  -     labetalol (Normodyne) 200 mg tablet; Take 1 tablet (200 mg) by mouth every 12 hours.  -     Lipid Panel; Future  -     Comprehensive Metabolic Panel; Future  Gastroesophageal reflux disease, unspecified whether esophagitis present  -     pantoprazole (ProtoNix) 40 mg EC tablet; Take 1 tablet (40 mg) by mouth once daily in the morning. Take before meals. Do not crush, chew, or split.  Wheezing due to allergy  -     albuterol (Ventolin HFA) 90 mcg/actuation inhaler; Inhale 2 puffs every 4 hours if needed for wheezing or shortness of breath.          COUNSELING      Medication education:              Education:  The patient is counseled regarding potential side-effects of any and all new medications             Understanding:  Patient expressed understanding             Adherence:  No barriers to adherence identified        Asia Tran, APRN-CNP

## 2024-12-02 ENCOUNTER — APPOINTMENT (OUTPATIENT)
Dept: GASTROENTEROLOGY | Facility: CLINIC | Age: 39
End: 2024-12-02
Payer: COMMERCIAL

## 2024-12-02 VITALS — OXYGEN SATURATION: 99 % | WEIGHT: 293 LBS | BODY MASS INDEX: 45.99 KG/M2 | HEART RATE: 84 BPM | HEIGHT: 67 IN

## 2024-12-02 DIAGNOSIS — R11.11 VOMITING WITHOUT NAUSEA, UNSPECIFIED VOMITING TYPE: ICD-10-CM

## 2024-12-02 DIAGNOSIS — K21.9 GASTROESOPHAGEAL REFLUX DISEASE, UNSPECIFIED WHETHER ESOPHAGITIS PRESENT: Primary | ICD-10-CM

## 2024-12-02 DIAGNOSIS — K80.20 CALCULUS OF GALLBLADDER WITHOUT CHOLECYSTITIS WITHOUT OBSTRUCTION: ICD-10-CM

## 2024-12-02 PROCEDURE — 99214 OFFICE O/P EST MOD 30 MIN: CPT

## 2024-12-02 PROCEDURE — 3008F BODY MASS INDEX DOCD: CPT

## 2024-12-02 RX ORDER — PANTOPRAZOLE SODIUM 40 MG/1
40 TABLET, DELAYED RELEASE ORAL
Qty: 90 TABLET | Refills: 3 | Status: SHIPPED | OUTPATIENT
Start: 2024-12-02 | End: 2025-11-27

## 2024-12-02 RX ORDER — FAMOTIDINE 40 MG/1
40 TABLET, FILM COATED ORAL NIGHTLY PRN
Qty: 30 TABLET | Refills: 3 | Status: SHIPPED | OUTPATIENT
Start: 2024-12-02 | End: 2025-12-02

## 2024-12-02 ASSESSMENT — ENCOUNTER SYMPTOMS
SHORTNESS OF BREATH: 0
DIARRHEA: 0
RECTAL PAIN: 0
FEVER: 0
FATIGUE: 0
ABDOMINAL DISTENTION: 0
CONSTIPATION: 0
APPETITE CHANGE: 0
VOMITING: 1
NAUSEA: 1
ABDOMINAL PAIN: 1
CHILLS: 0
ANAL BLEEDING: 0
BLOOD IN STOOL: 0
COUGH: 0
TROUBLE SWALLOWING: 0

## 2024-12-02 NOTE — PROGRESS NOTES
Subjective     History of Present Illness:   Melanie Walsh is a 39 y.o. female with PMHx of anxiety, HTN, PCOS   who presents to GI clinic for follow up.      Last seen 2023 for GERD, 40 mg Protonix, consider decrease dose if improved.  RUQ ultrasound showed fatty liver and gallstones, surgical referral recommended-patient preference was to observe and referral to nutrition    - 10/2023 for GERD and patient complains of allergies.  EGD ordered, Protonix.    Today, patient states 1-2 times monthly she has epigastric pain and  vomiting, possibly correlating with inadequate hydration.  Will get LUQ at night when rocking her child.  Still has lots of stress.  States her nausea and vomiting has significantly lessened since starting 40 mg daily Protonix.  Feels her current nausea and vomiting does not correlate with LUQ pain.  Has a lot of stress which also seem to play a role.  States she is seeing dietitian and making lifestyle modifications.    Denies any other GI complaints today    Denies ETOH, smoking, marijuana  Denies fxh GI cancer or IBD  Abdominal Surgeries: , cough palate repair     Denies history of colonoscopy  Last EGD 2023 Dr. Guillaume: Grade B reflux esophagitis, bilious gastric fluid, erythematous mucosa gastric body and antrum.  Pathology benign, negative EOE  Review of Systems  Review of Systems   Constitutional:  Negative for appetite change, chills, fatigue and fever.   HENT:  Negative for trouble swallowing.    Respiratory:  Negative for cough and shortness of breath.    Gastrointestinal:  Positive for abdominal pain, nausea and vomiting. Negative for abdominal distention, anal bleeding, blood in stool, constipation, diarrhea and rectal pain.       Allergies  Allergies   Allergen Reactions    Cayenne Anaphylaxis    House Dust Mite Anaphylaxis    Mold Anaphylaxis    Ragweed Pollen Anaphylaxis    Penicillin V Hives    Animal Dander Runny nose       Medications  Current Outpatient  Medications   Medication Instructions    albuterol (Ventolin HFA) 90 mcg/actuation inhaler 2 puffs, inhalation, Every 4 hours PRN    cetirizine (ZYRTEC) 10 mg capsule Daily    cholecalciferol, vitamin D3, (VITAMIN D3 ORAL) Take by mouth.    EPINEPHrine (EPIPEN) 0.3 mg, intramuscular, As needed, Inject into UPPER, OUTER THIGH. Call 911 after use.    famotidine (PEPCID) 40 mg, oral, Nightly PRN    labetalol (NORMODYNE) 200 mg, oral, Every 12 hours    multivitamin tablet 1 tablet, Daily    norethindrone (MICRONOR) 0.35 mg, oral, Daily    pantoprazole (PROTONIX) 40 mg, oral, Daily before breakfast, Do not crush, chew, or split.        Objective   Visit Vitals  Pulse 84        Physical Exam  Constitutional:       Appearance: Normal appearance. She is obese.   HENT:      Mouth/Throat:      Mouth: Mucous membranes are dry.      Pharynx: Oropharynx is clear.   Cardiovascular:      Rate and Rhythm: Normal rate and regular rhythm.   Pulmonary:      Effort: Pulmonary effort is normal.      Breath sounds: Normal breath sounds. No wheezing or rhonchi.   Abdominal:      General: Abdomen is flat. Bowel sounds are normal. There is no distension.      Palpations: Abdomen is soft. There is no hepatomegaly.      Tenderness: There is no abdominal tenderness. There is no guarding or rebound. Negative signs include Crystal's sign.      Hernia: No hernia is present.   Musculoskeletal:         General: Normal range of motion.   Skin:     General: Skin is warm and dry.   Neurological:      General: No focal deficit present.      Mental Status: She is alert and oriented to person, place, and time.   Psychiatric:         Mood and Affect: Mood normal.         Behavior: Behavior normal.           Lab Results   Component Value Date    WBC 9.0 05/01/2024    WBC 7.7 01/03/2023    WBC 10.7 11/13/2022    HGB 14.4 05/01/2024    HGB 13.8 01/03/2023    HGB 9.2 (L) 11/13/2022    HCT 43.4 05/01/2024    HCT 43.4 01/03/2023    HCT 28.2 (L) 11/13/2022    PLT  399 05/01/2024     (H) 01/03/2023     11/13/2022     Lab Results   Component Value Date     05/01/2024     01/03/2023     04/28/2022    K 4.3 05/01/2024    K 4.7 01/03/2023    K 4.1 04/28/2022     05/01/2024     01/03/2023     04/28/2022    CO2 28 05/01/2024    CO2 26 01/03/2023    CO2 23 04/28/2022    BUN 16 05/01/2024    BUN 17 01/03/2023    BUN 9 04/28/2022    CREATININE 0.58 05/01/2024    CREATININE 0.73 01/03/2023    CREATININE 0.49 (L) 04/28/2022    CALCIUM 9.6 05/01/2024    CALCIUM 9.9 01/03/2023    CALCIUM 9.9 04/28/2022    PROT 6.7 01/03/2023    PROT 6.5 04/28/2022    PROT 7.1 03/16/2022    BILITOT 0.4 01/03/2023    BILITOT 0.4 04/28/2022    BILITOT 0.4 03/16/2022    ALKPHOS 104 01/03/2023    ALKPHOS 56 04/28/2022    ALKPHOS 77 03/16/2022    ALT 28 01/03/2023    ALT 21 04/28/2022    ALT 17 03/16/2022    AST 17 01/03/2023    AST 14 04/28/2022    AST 14 03/16/2022    GLUCOSE 90 05/01/2024    GLUCOSE 86 01/03/2023    GLUCOSE 85 04/28/2022           Melanie Walsh is a 39 y.o. female who presents to GI clinic for GERD, nausea and vomiting.    Gastroesophageal reflux disease  Appears partially controlled on 40 mg Protonix  -Continue 40 mg daily Protonix  -Start 40 mg daily or as needed Pepcid    Follow-up annually or as needed    Calculus of gallbladder  Nausea and vomiting may correlate with gallstones/epigastric pain  -Surgical referral placed         Brenda Luis, APRN-CNP

## 2024-12-02 NOTE — ASSESSMENT & PLAN NOTE
Appears partially controlled on 40 mg Protonix  -Continue 40 mg daily Protonix  -Start 40 mg daily or as needed Pepcid    Follow-up annually or as needed

## 2024-12-02 NOTE — PATIENT INSTRUCTIONS
Continue your daily pantoprazole for stomach acid.  I would like you to try famotidine/pepcid nightly or as needed for the left sided pain  I will refer you to Dr. Dumont to assess if you need surgery [phone]    Follow up annually or as needed

## 2024-12-05 ENCOUNTER — APPOINTMENT (OUTPATIENT)
Dept: PRIMARY CARE | Facility: CLINIC | Age: 39
End: 2024-12-05
Payer: COMMERCIAL

## 2025-01-02 ENCOUNTER — OFFICE VISIT (OUTPATIENT)
Dept: SURGERY | Facility: CLINIC | Age: 40
End: 2025-01-02
Payer: COMMERCIAL

## 2025-01-02 VITALS
BODY MASS INDEX: 45.99 KG/M2 | HEART RATE: 88 BPM | WEIGHT: 293 LBS | SYSTOLIC BLOOD PRESSURE: 119 MMHG | TEMPERATURE: 97.5 F | DIASTOLIC BLOOD PRESSURE: 77 MMHG | HEIGHT: 67 IN

## 2025-01-02 DIAGNOSIS — K80.50 BILIARY COLIC: Primary | ICD-10-CM

## 2025-01-02 DIAGNOSIS — R11.11 VOMITING WITHOUT NAUSEA, UNSPECIFIED VOMITING TYPE: ICD-10-CM

## 2025-01-02 DIAGNOSIS — K80.20 CALCULUS OF GALLBLADDER WITHOUT CHOLECYSTITIS WITHOUT OBSTRUCTION: ICD-10-CM

## 2025-01-02 PROCEDURE — 1036F TOBACCO NON-USER: CPT | Performed by: SURGERY

## 2025-01-02 PROCEDURE — 3078F DIAST BP <80 MM HG: CPT | Performed by: SURGERY

## 2025-01-02 PROCEDURE — 3074F SYST BP LT 130 MM HG: CPT | Performed by: SURGERY

## 2025-01-02 PROCEDURE — 99213 OFFICE O/P EST LOW 20 MIN: CPT | Performed by: SURGERY

## 2025-01-02 PROCEDURE — 99203 OFFICE O/P NEW LOW 30 MIN: CPT | Performed by: SURGERY

## 2025-01-02 PROCEDURE — 3008F BODY MASS INDEX DOCD: CPT | Performed by: SURGERY

## 2025-01-02 ASSESSMENT — PAIN SCALES - GENERAL: PAINLEVEL_OUTOF10: 0-NO PAIN

## 2025-01-02 NOTE — PROGRESS NOTES
History Of Present Illness  Melanie Walsh is a 39 y.o. female presenting with 2-year history of postprandial epigastric pain accompanied by bloating nausea and vomiting.  It started during her first pregnancy.  Initially it was occurring on a weekly basis but with some dietary adjustments it has been occurring maybe once a month or so.  She had her last episode on  sravani by after eating some heavy foods.  She had an EGD done in  that showed some gastritis and she is on a PPI for that.  She says the pain occasionally goes to her back and shoulder.  She has never had to go to the emergency room for it.  She has a history of hypertension and PCOS.  Surgical history includes cleft palate surgery as a child and then  2 years ago     Past Medical History  Past Medical History:   Diagnosis Date    Anxiety 2023    Candidiasis, unspecified 2022    Yeast infection    Cleft palate     Delivery by elective  section ([facility]) 2024    Encounter for immunization     COVID-19 vaccine administered    HL (hearing loss)     Hypertension 2018    Incomplete spontaneous  ([facility]) 2024    Obesity     Personal history of other complications of pregnancy, childbirth and the puerperium 2020    History of spontaneous     Personal history of other diseases of the circulatory system 2022    History of hypertension    Varicella without complication     Varicella without complication       Surgical History  Past Surgical History:   Procedure Laterality Date     SECTION, LOW TRANSVERSE  2022    OTHER SURGICAL HISTORY  2016    Palatoplasty For Cleft Palate    OTHER SURGICAL HISTORY  12/15/2022     section    OTHER SURGICAL HISTORY  2022    Cleft palate repair    RHINOPLASTY      TYMPANOSTOMY TUBE PLACEMENT          Social History  She reports that she has never smoked. She has never been exposed to tobacco smoke. She has never used  "smokeless tobacco. She reports that she does not drink alcohol and does not use drugs.    Family History  Family History   Problem Relation Name Age of Onset    Diabetes Mother Cynthia     Osteoarthritis Mother Cynthia     Diabetes Father Osmin     Diabetes Maternal Grandfather Sly     Diabetes Maternal Grandmother Cynthia     Hearing loss Maternal Grandmother Cynthia         Allergies  Cayenne, House dust mite, Mold, Ragweed pollen, Penicillin v, and Animal dander    Review of Systems  Constitutional: no weight loss, no fevers, no malaise  HEENT: negative  Neck: negative  Pulmonary: no SOB, no cough  CV: no chest pain, otherwise negative  GI: See HPI  : no hematuria, retention.  MS: no aches/pains  Neurologic: negative  Skin: no rashes, lesions  HEME: no bleeding tendency, no bruising  Psych: no mood issues    Physical Exam  Constitutional: Mild distress.  Alert and oriented x 3.    Skin: non jaundiced  HEENT: normal  Lungs: clear to auscultation  CV: RRR, S1S2, no murmurs  Abdomen: soft, mild discomfort RUQ.  No obvious hernias.  No diffuse peritoneal signs  MS: grossly normal  Neuro: grossly normal    Last Recorded Vitals  Blood pressure 119/77, pulse 88, temperature 36.4 °C (97.5 °F), temperature source Temporal, height 1.702 m (5' 7\"), weight 138 kg (303 lb 12.8 oz).    Relevant Results      Status Exam Begun Exam Ended   Final 3/04/2024 09:35 3/04/2024 10:01     Study Result    Narrative & Impression   STUDY:  Right Upper Quadrant Ultrasound; 3/4/2024 10:02 am.  INDICATION:  Epigastric pain. Nausea. Vomiting.  COMPARISON:  None available.  ACCESSION NUMBER(S):  [id number]  ORDERING CLINICIAN:  RIVKA HODGES  TECHNIQUE:  Ultrasound of the Right Upper Quadrant.  FINDINGS:  LIVER:  The liver measures 18.5 cm and demonstrates increased echogenicity.  Hepatic cyst measuring 1.3 x 0.9 x 1.5 cm.       GALLBLADDER:  The gallbladder contains multiple stones. There is no pericholecystic  fluid or wall thickening. " Sonographic Crystal's sign is negative.        BILE DUCTS:  The common bile duct measures 0.3 cm. There is no intrahepatic biliary  dilatation.       PANCREAS:  The pancreas is not well seen due to overlying bowel gas.      RIGHT KIDNEY:  The right kidney measures 11.4 cm in length. Renal cortical  echotexture is normal. There is no hydronephrosis. There are no  stones. There are no cysts.  IMPRESSION:  Hepatomegaly with diffuse hepatic steatosis. Hepatic cyst.  Cholelithiasis. No gallbladder wall thickening or biliary dilatation  is appreciated.   Signed by Eleazar Giraldo MD        Assessment/Plan       Impression:  Symptomatic Cholelithiasis    -I carefully explained the pathophysiology of biliary tract disease using terms and pictures  -Rationale for surgical intervention described  -Technique of laparoscopic cholecystectomy explained (both standard and robotic assisted)  -Risks of surgery elucidated (bleeding, infection, bile leak, CBD injury, etc)  -Other options presented (watchful waiting, avoidance of fatty foods)  -All patient questions answered to her satisfaction.  -Patient has indicated desire to pursue surgical intervention  -Office will arrange at patient convenience.       I spent 35 minutes in the professional and overall care of this patient.      Dallin Dumont MD

## 2025-01-02 NOTE — LETTER
2025     Brenda Luis, APRN-[facility]  [id number] [address] [id number]    Patient: Melanie Walsh   YOB: 1985   Date of Visit: 2025       Dear Dr. Brenda Luis, APRN-CNP:    Thank you for referring Melanie Walsh to me for evaluation. Below are my notes for this consultation.  If you have questions, please do not hesitate to call me. I look forward to following your patient along with you.       Sincerely,     Dallin Dumont MD      CC: Asia Tran, APRN-CNP  ______________________________________________________________________________________    History Of Present Illness  Melanie Walsh is a 39 y.o. female presenting with 2-year history of postprandial epigastric pain accompanied by bloating nausea and vomiting.  It started during her first pregnancy.  Initially it was occurring on a weekly basis but with some dietary adjustments it has been occurring maybe once a month or so.  She had her last episode on  sravani by after eating some heavy foods.  She had an EGD done in  that showed some gastritis and she is on a PPI for that.  She says the pain occasionally goes to her back and shoulder.  She has never had to go to the emergency room for it.  She has a history of hypertension and PCOS.  Surgical history includes cleft palate surgery as a child and then  2 years ago     Past Medical History  Past Medical History:   Diagnosis Date   • Anxiety 2023   • Candidiasis, unspecified 2022    Yeast infection   • Cleft palate    • Delivery by elective  section ([facility]) 2024   • Encounter for immunization     COVID-19 vaccine administered   • HL (hearing loss)    • Hypertension 2018   • Incomplete spontaneous  ([facility]) 2024   • Obesity    • Personal history of other complications of pregnancy, childbirth and the puerperium 2020    History of spontaneous    • Personal history of other diseases of the  "circulatory system 2022    History of hypertension   • Varicella without complication     Varicella without complication       Surgical History  Past Surgical History:   Procedure Laterality Date   •  SECTION, LOW TRANSVERSE  2022   • OTHER SURGICAL HISTORY  2016    Palatoplasty For Cleft Palate   • OTHER SURGICAL HISTORY  12/15/2022     section   • OTHER SURGICAL HISTORY  2022    Cleft palate repair   • RHINOPLASTY     • TYMPANOSTOMY TUBE PLACEMENT          Social History  She reports that she has never smoked. She has never been exposed to tobacco smoke. She has never used smokeless tobacco. She reports that she does not drink alcohol and does not use drugs.    Family History  Family History   Problem Relation Name Age of Onset   • Diabetes Mother Cynthia    • Osteoarthritis Mother Cynthia    • Diabetes Father Osmin    • Diabetes Maternal Grandfather Sly    • Diabetes Maternal Grandmother Cynthia    • Hearing loss Maternal Grandmother Cynthia         Allergies  Cayenne, House dust mite, Mold, Ragweed pollen, Penicillin v, and Animal dander    Review of Systems  Constitutional: no weight loss, no fevers, no malaise  HEENT: negative  Neck: negative  Pulmonary: no SOB, no cough  CV: no chest pain, otherwise negative  GI: See HPI  : no hematuria, retention.  MS: no aches/pains  Neurologic: negative  Skin: no rashes, lesions  HEME: no bleeding tendency, no bruising  Psych: no mood issues    Physical Exam  Constitutional: Mild distress.  Alert and oriented x 3.    Skin: non jaundiced  HEENT: normal  Lungs: clear to auscultation  CV: RRR, S1S2, no murmurs  Abdomen: soft, mild discomfort RUQ.  No obvious hernias.  No diffuse peritoneal signs  MS: grossly normal  Neuro: grossly normal    Last Recorded Vitals  Blood pressure 119/77, pulse 88, temperature 36.4 °C (97.5 °F), temperature source Temporal, height 1.702 m (5' 7\"), weight 138 kg (303 lb 12.8 oz).    Relevant Results      Status " Exam Begun Exam Ended   Final 3/04/2024 09:35 3/04/2024 10:01     Study Result    Narrative & Impression   STUDY:  Right Upper Quadrant Ultrasound; 3/4/2024 10:02 am.  INDICATION:  Epigastric pain. Nausea. Vomiting.  COMPARISON:  None available.  ACCESSION NUMBER(S):  [id number]  ORDERING CLINICIAN:  RIVKA HODGES  TECHNIQUE:  Ultrasound of the Right Upper Quadrant.  FINDINGS:  LIVER:  The liver measures 18.5 cm and demonstrates increased echogenicity.  Hepatic cyst measuring 1.3 x 0.9 x 1.5 cm.       GALLBLADDER:  The gallbladder contains multiple stones. There is no pericholecystic  fluid or wall thickening. Sonographic Crystal's sign is negative.        BILE DUCTS:  The common bile duct measures 0.3 cm. There is no intrahepatic biliary  dilatation.       PANCREAS:  The pancreas is not well seen due to overlying bowel gas.      RIGHT KIDNEY:  The right kidney measures 11.4 cm in length. Renal cortical  echotexture is normal. There is no hydronephrosis. There are no  stones. There are no cysts.  IMPRESSION:  Hepatomegaly with diffuse hepatic steatosis. Hepatic cyst.  Cholelithiasis. No gallbladder wall thickening or biliary dilatation  is appreciated.   Signed by Eleazar Giraldo MD        Assessment/Plan      Impression:  Symptomatic Cholelithiasis    -I carefully explained the pathophysiology of biliary tract disease using terms and pictures  -Rationale for surgical intervention described  -Technique of laparoscopic cholecystectomy explained (both standard and robotic assisted)  -Risks of surgery elucidated (bleeding, infection, bile leak, CBD injury, etc)  -Other options presented (watchful waiting, avoidance of fatty foods)  -All patient questions answered to her satisfaction.  -Patient has indicated desire to pursue surgical intervention  -Office will arrange at patient convenience.       I spent 35 minutes in the professional and overall care of this patient.      Dallin Dumont MD

## 2025-01-06 ENCOUNTER — APPOINTMENT (OUTPATIENT)
Dept: OTOLARYNGOLOGY | Facility: CLINIC | Age: 40
End: 2025-01-06
Payer: COMMERCIAL

## 2025-01-07 ENCOUNTER — APPOINTMENT (OUTPATIENT)
Dept: PRIMARY CARE | Facility: CLINIC | Age: 40
End: 2025-01-07
Payer: COMMERCIAL

## 2025-01-07 DIAGNOSIS — E66.01 MORBID (SEVERE) OBESITY DUE TO EXCESS CALORIES (MULTI): ICD-10-CM

## 2025-01-07 DIAGNOSIS — Z71.3 DIETARY COUNSELING AND SURVEILLANCE: Primary | ICD-10-CM

## 2025-01-07 PROCEDURE — 97803 MED NUTRITION INDIV SUBSEQ: CPT

## 2025-01-07 NOTE — PROGRESS NOTES
Nutrition: Follow-up     Reason for Nutrition Visit: Patient is a 38 y.o. female referred for obesity. Referred on 3/7/24 by Dr. More Wise. Per chart review, pt has h/o gallstones.       Nutrition Assessment    Food & Nutrition Related History: Pt presents in office for visit 4. Holidays were a difficult time due to several parties and busy schedule. Trying to get back on track. Has questions about specific diets such as blood type and IF.     Allergies: blue cheese  Intolerance: None  Appetite: Good  Intake: >75%  GI Symptoms : reflux with fatty foods   Swallowing Difficulty: No problems with swallowing  Dentition : own  Food Preparation: Patient  Cooking Skills/Barriers: None reported  Grocery Shopping: Patient  - M[employer] and y prime, goes grocery shopping 1x per week   Supplements: prenatal daily  Food Insecurity: Denies   [place] (1x per week), chicken, pork (1x per week), beef (1x per week)   Sides - quinoa, rice, vegetables     Dietary Recall:   Meal 1: Eggs with tatsoi and feta, whole grain toast, vanilla Greek yogurt, cold brew coffee with half and sugar   Snacks: trail mix, mixed nuts, baby bells  Meal 2: left over stromboli   Dinner: chicken thighs, home made queso, and white rice    Snacks: walnuts, cheese crackers, fruit   Beverages: water, coffee with sugar and cream   Eating out: 1x per week is typical   Alcohol Intake: none  Self-Identified Challenges: negative relationship with food in the past, met with therapist     Physical Activity: Going for daily walks     Labs:  CMP trend:    Recent Labs     05/01/24  1035 01/03/23  1120 04/28/22  1425 03/16/22  1059   GLUCOSE 90 86 85 80    140 137 136   K 4.3 4.7 4.1 3.8    106 103 102   CO2 28 26 23 22   ANIONGAP 11 13 15 16   BUN 16 17 9 18   CREATININE 0.58 0.73 0.49* 0.58   EGFR >90  --   --   --    CALCIUM 9.6 9.9 9.9 9.6   ALBUMIN  --  4.3 4.3 4.3   ALKPHOS  --  104 56 77   PROT  --  6.7 6.5 7.1   AST  --  17 14 14   BILITOT  --  0.4  "0.4 0.4   ALT  --  28 21 17     Lipid Panel trend:    Recent Labs     05/01/24  1035 01/03/23  1120 09/10/21  0819   CHOL 164 174 156   HDL 66.6 61.0 53.2   LDLF  --   --  89   VLDL  --   --  14   TRIG  --   --  68     Vit D:   Lab Results   Component Value Date    VITD25 33 05/01/2024     DM specific labs:   Recent Labs     05/01/24  1035 01/03/23  1120 04/28/22  1425   HGBA1C 5.1 5.2 5.4         Nutrition Focused Physical Exam:  Performed/Deferred: Deferred as pt visually appears well-nourished with no signs of malnutrition      Past Medical History:  Patient Active Problem List   Diagnosis    Abnormal fetal echocardiogram affecting antepartum care of mother ([facility])    Abnormal tympanogram    Anaphylaxis    Anxiety    Benign essential hypertension    Central perforation of tympanic membrane of right ear    Chronic fatigue    Chronic hypertension in pregnancy ([facility])    Dermatographism    Edema, leg    Elderly multigravida with antepartum condition or complication ([facility])    Female infertility    Hearing loss    History of vitamin D deficiency    Lactation disorder ([facility])    Mixed conductive and sensorineural hearing loss of right ear with unrestricted hearing of left ear    Drug-induced obesity    PCOS (polycystic ovarian syndrome)    Postpartum depression    High risk multigravida in third trimester ([facility])    Primigravida of advanced maternal age in third trimester ([facility])    Recurrent acute serous otitis media of right ear    Allergic rhinitis    Gastroesophageal reflux disease    Epigastric pain    Vomiting without nausea    Gall bladder stones        Anthropometrics:  Ht Readings from Last 1 Encounters:   05/01/24 1.702 m (5' 7\")     BMI Readings from Last 1 Encounters:   01/02/25 47.58 kg/m²     Wt Readings from Last 10 Encounters:   01/02/25 138 kg (303 lb 12.8 oz)   12/02/24 139 kg (306 lb)   11/04/24 135 kg (298 lb)   08/26/24 130 kg (286 lb)   07/30/24 130 kg (285 lb 14.4 oz)   06/20/24 131 " kg (288 lb)   05/02/24 130 kg (287 lb 5.9 oz)   03/18/24 127 kg (279 lb)   03/01/24 124 kg (273 lb 11.2 oz)   01/30/24 126 kg (277 lb 9.6 oz)       Estimated Nutrition Needs:    Total Energy Estimated Needs (kCal): 1875 kCal   Method for Estimating Needs: MSJ 1983 x 1.2 - 500 (for weight loss)   Total Protein Estimated Needs (g): 100 g   Total Protein Estimated Needs (g/kg): 0.8 g/kg    Nutrition Diagnosis     Patient has Nutrition Diagnosis: Yes Diagnosis Status (1): Ongoing   Nutrition Diagnosis 1: Food and nutrition related knowledge deficit Related to (1): limited prior nutrition education on general nutrition topics (meal planning) provided by a nutrition professional As Evidenced by (1): patient's desire to learn, patient's questions       Nutrition Interventions/Recommendations   FOOD & NUTRIENT DELIVERY: Decreased Fat Diet, Decreased Sodium Diet, Increased Fiber Diet, and Low Saturated Fat Diet    NUTRITION EDUCATION:   Recommend starting vitamin D3 2000 International Units daily from a [employer] certified brand.   Counseled on low fat diet in preparation for gallbladder surgery.     Educational Handouts: Fat Restricted Diet Nutrition Therapy, 5 Menus for Low Fat Diet     *Patient expressed understanding of the education provided and denied any additional questions/concerns.       Nutrition Monitoring and Evaluation   Nutrition Goals : Consistent meal/snack pattern - met, ongoing   Increase awareness and respond to hunger cues - met, ongoing   Increase awareness and respond to satiety cues - met, ongoing     Readiness to Change : Excellent  Level of Understanding : Excellent  Anticipated Compliant : Excellent     Follow-up: 3-4 months

## 2025-01-07 NOTE — PATIENT INSTRUCTIONS
Recommend starting vitamin D3 2000 International Units daily from a [employer] certified brand.   Recommend low fat diet for after gall bladder surgery. See reference material.

## 2025-01-27 ENCOUNTER — APPOINTMENT (OUTPATIENT)
Dept: OTOLARYNGOLOGY | Facility: CLINIC | Age: 40
End: 2025-01-27
Payer: COMMERCIAL

## 2025-02-14 ENCOUNTER — ANESTHESIA (OUTPATIENT)
Dept: OPERATING ROOM | Facility: HOSPITAL | Age: 40
End: 2025-02-14
Payer: COMMERCIAL

## 2025-02-14 ENCOUNTER — APPOINTMENT (OUTPATIENT)
Dept: RADIOLOGY | Facility: HOSPITAL | Age: 40
End: 2025-02-14
Payer: COMMERCIAL

## 2025-02-14 ENCOUNTER — HOSPITAL ENCOUNTER (OUTPATIENT)
Facility: HOSPITAL | Age: 40
Setting detail: OUTPATIENT SURGERY
Discharge: HOME | End: 2025-02-14
Attending: SURGERY | Admitting: SURGERY
Payer: COMMERCIAL

## 2025-02-14 ENCOUNTER — ANESTHESIA EVENT (OUTPATIENT)
Dept: OPERATING ROOM | Facility: HOSPITAL | Age: 40
End: 2025-02-14
Payer: COMMERCIAL

## 2025-02-14 VITALS
BODY MASS INDEX: 45.99 KG/M2 | HEART RATE: 58 BPM | DIASTOLIC BLOOD PRESSURE: 86 MMHG | OXYGEN SATURATION: 97 % | SYSTOLIC BLOOD PRESSURE: 119 MMHG | WEIGHT: 293 LBS | HEIGHT: 67 IN | TEMPERATURE: 96.8 F | RESPIRATION RATE: 12 BRPM

## 2025-02-14 DIAGNOSIS — K80.20 CALCULUS OF GALLBLADDER WITHOUT CHOLECYSTITIS WITHOUT OBSTRUCTION: ICD-10-CM

## 2025-02-14 DIAGNOSIS — K80.50 BILIARY COLIC: Primary | ICD-10-CM

## 2025-02-14 PROBLEM — Z98.890 PONV (POSTOPERATIVE NAUSEA AND VOMITING): Status: ACTIVE | Noted: 2025-02-14

## 2025-02-14 PROBLEM — R11.2 PONV (POSTOPERATIVE NAUSEA AND VOMITING): Status: ACTIVE | Noted: 2025-02-14

## 2025-02-14 LAB — PREGNANCY TEST URINE, POC: NEGATIVE

## 2025-02-14 PROCEDURE — 2500000005 HC RX 250 GENERAL PHARMACY W/O HCPCS: Performed by: ANESTHESIOLOGY

## 2025-02-14 PROCEDURE — 7100000001 HC RECOVERY ROOM TIME - INITIAL BASE CHARGE: Performed by: SURGERY

## 2025-02-14 PROCEDURE — 81025 URINE PREGNANCY TEST: CPT | Performed by: SURGERY

## 2025-02-14 PROCEDURE — 2720000007 HC OR 272 NO HCPCS: Performed by: SURGERY

## 2025-02-14 PROCEDURE — 47563 LAPARO CHOLECYSTECTOMY/GRAPH: CPT | Performed by: SURGERY

## 2025-02-14 PROCEDURE — 3600000008 HC OR TIME - EACH INCREMENTAL 1 MINUTE - PROCEDURE LEVEL THREE: Performed by: SURGERY

## 2025-02-14 PROCEDURE — C1729 CATH, DRAINAGE: HCPCS | Performed by: SURGERY

## 2025-02-14 PROCEDURE — 2500000002 HC RX 250 W HCPCS SELF ADMINISTERED DRUGS (ALT 637 FOR MEDICARE OP, ALT 636 FOR OP/ED): Performed by: ANESTHESIOLOGY

## 2025-02-14 PROCEDURE — 2550000001 HC RX 255 CONTRASTS: Performed by: SURGERY

## 2025-02-14 PROCEDURE — 2500000004 HC RX 250 GENERAL PHARMACY W/ HCPCS (ALT 636 FOR OP/ED): Performed by: ANESTHESIOLOGY

## 2025-02-14 PROCEDURE — 2500000005 HC RX 250 GENERAL PHARMACY W/O HCPCS: Performed by: ANESTHESIOLOGIST ASSISTANT

## 2025-02-14 PROCEDURE — 3700000002 HC GENERAL ANESTHESIA TIME - EACH INCREMENTAL 1 MINUTE: Performed by: SURGERY

## 2025-02-14 PROCEDURE — 88304 TISSUE EXAM BY PATHOLOGIST: CPT | Mod: TC,AHULAB,WESLAB | Performed by: SURGERY

## 2025-02-14 PROCEDURE — 2500000004 HC RX 250 GENERAL PHARMACY W/ HCPCS (ALT 636 FOR OP/ED): Performed by: SURGERY

## 2025-02-14 PROCEDURE — 7100000002 HC RECOVERY ROOM TIME - EACH INCREMENTAL 1 MINUTE: Performed by: SURGERY

## 2025-02-14 PROCEDURE — 7100000010 HC PHASE TWO TIME - EACH INCREMENTAL 1 MINUTE: Performed by: SURGERY

## 2025-02-14 PROCEDURE — 2500000001 HC RX 250 WO HCPCS SELF ADMINISTERED DRUGS (ALT 637 FOR MEDICARE OP): Performed by: ANESTHESIOLOGY

## 2025-02-14 PROCEDURE — 2500000004 HC RX 250 GENERAL PHARMACY W/ HCPCS (ALT 636 FOR OP/ED): Performed by: ANESTHESIOLOGIST ASSISTANT

## 2025-02-14 PROCEDURE — 2500000005 HC RX 250 GENERAL PHARMACY W/O HCPCS: Performed by: SURGERY

## 2025-02-14 PROCEDURE — 3700000001 HC GENERAL ANESTHESIA TIME - INITIAL BASE CHARGE: Performed by: SURGERY

## 2025-02-14 PROCEDURE — 7100000009 HC PHASE TWO TIME - INITIAL BASE CHARGE: Performed by: SURGERY

## 2025-02-14 PROCEDURE — 88304 TISSUE EXAM BY PATHOLOGIST: CPT | Performed by: PATHOLOGY

## 2025-02-14 PROCEDURE — 3600000003 HC OR TIME - INITIAL BASE CHARGE - PROCEDURE LEVEL THREE: Performed by: SURGERY

## 2025-02-14 PROCEDURE — 74300 X-RAY BILE DUCTS/PANCREAS: CPT

## 2025-02-14 RX ORDER — BUPIVACAINE HYDROCHLORIDE 5 MG/ML
INJECTION, SOLUTION PERINEURAL AS NEEDED
Status: DISCONTINUED | OUTPATIENT
Start: 2025-02-14 | End: 2025-02-14 | Stop reason: HOSPADM

## 2025-02-14 RX ORDER — ROCURONIUM BROMIDE 10 MG/ML
INJECTION, SOLUTION INTRAVENOUS AS NEEDED
Status: DISCONTINUED | OUTPATIENT
Start: 2025-02-14 | End: 2025-02-14

## 2025-02-14 RX ORDER — SODIUM CHLORIDE, SODIUM LACTATE, POTASSIUM CHLORIDE, CALCIUM CHLORIDE 600; 310; 30; 20 MG/100ML; MG/100ML; MG/100ML; MG/100ML
INJECTION, SOLUTION INTRAVENOUS CONTINUOUS PRN
Status: DISCONTINUED | OUTPATIENT
Start: 2025-02-14 | End: 2025-02-14

## 2025-02-14 RX ORDER — PROPOFOL 10 MG/ML
INJECTION, EMULSION INTRAVENOUS AS NEEDED
Status: DISCONTINUED | OUTPATIENT
Start: 2025-02-14 | End: 2025-02-14

## 2025-02-14 RX ORDER — LABETALOL HYDROCHLORIDE 5 MG/ML
5 INJECTION, SOLUTION INTRAVENOUS ONCE AS NEEDED
Status: DISCONTINUED | OUTPATIENT
Start: 2025-02-14 | End: 2025-02-14 | Stop reason: HOSPADM

## 2025-02-14 RX ORDER — CEFAZOLIN 1 G/1
INJECTION, POWDER, FOR SOLUTION INTRAVENOUS AS NEEDED
Status: DISCONTINUED | OUTPATIENT
Start: 2025-02-14 | End: 2025-02-14

## 2025-02-14 RX ORDER — MIDAZOLAM HYDROCHLORIDE 1 MG/ML
INJECTION INTRAMUSCULAR; INTRAVENOUS AS NEEDED
Status: DISCONTINUED | OUTPATIENT
Start: 2025-02-14 | End: 2025-02-14

## 2025-02-14 RX ORDER — POLYETHYLENE GLYCOL 3350 17 G/17G
17 POWDER, FOR SOLUTION ORAL DAILY PRN
Qty: 10 PACKET | Refills: 0 | Status: SHIPPED | OUTPATIENT
Start: 2025-02-14

## 2025-02-14 RX ORDER — APREPITANT 40 MG/1
CAPSULE ORAL AS NEEDED
Status: DISCONTINUED | OUTPATIENT
Start: 2025-02-14 | End: 2025-02-14

## 2025-02-14 RX ORDER — SODIUM CHLORIDE 0.9 G/100ML
INJECTION, SOLUTION IRRIGATION AS NEEDED
Status: DISCONTINUED | OUTPATIENT
Start: 2025-02-14 | End: 2025-02-14 | Stop reason: HOSPADM

## 2025-02-14 RX ORDER — KETOROLAC TROMETHAMINE 30 MG/ML
INJECTION, SOLUTION INTRAMUSCULAR; INTRAVENOUS AS NEEDED
Status: DISCONTINUED | OUTPATIENT
Start: 2025-02-14 | End: 2025-02-14

## 2025-02-14 RX ORDER — SCOPOLAMINE 1 MG/3D
PATCH, EXTENDED RELEASE TRANSDERMAL AS NEEDED
Status: DISCONTINUED | OUTPATIENT
Start: 2025-02-14 | End: 2025-02-14

## 2025-02-14 RX ORDER — FENTANYL CITRATE 50 UG/ML
INJECTION, SOLUTION INTRAMUSCULAR; INTRAVENOUS AS NEEDED
Status: DISCONTINUED | OUTPATIENT
Start: 2025-02-14 | End: 2025-02-14

## 2025-02-14 RX ORDER — LIDOCAINE HYDROCHLORIDE 10 MG/ML
0.1 INJECTION, SOLUTION EPIDURAL; INFILTRATION; INTRACAUDAL; PERINEURAL ONCE
Status: DISCONTINUED | OUTPATIENT
Start: 2025-02-14 | End: 2025-02-14 | Stop reason: HOSPADM

## 2025-02-14 RX ORDER — ALBUTEROL SULFATE 0.83 MG/ML
2.5 SOLUTION RESPIRATORY (INHALATION) ONCE AS NEEDED
Status: DISCONTINUED | OUTPATIENT
Start: 2025-02-14 | End: 2025-02-14 | Stop reason: HOSPADM

## 2025-02-14 RX ORDER — SODIUM CHLORIDE, SODIUM LACTATE, POTASSIUM CHLORIDE, CALCIUM CHLORIDE 600; 310; 30; 20 MG/100ML; MG/100ML; MG/100ML; MG/100ML
100 INJECTION, SOLUTION INTRAVENOUS CONTINUOUS
Status: DISCONTINUED | OUTPATIENT
Start: 2025-02-14 | End: 2025-02-14 | Stop reason: HOSPADM

## 2025-02-14 RX ORDER — LIDOCAINE HCL/PF 100 MG/5ML
SYRINGE (ML) INTRAVENOUS AS NEEDED
Status: DISCONTINUED | OUTPATIENT
Start: 2025-02-14 | End: 2025-02-14

## 2025-02-14 RX ORDER — OXYCODONE HYDROCHLORIDE 5 MG/1
5 TABLET ORAL EVERY 6 HOURS PRN
Qty: 12 TABLET | Refills: 0 | Status: SHIPPED | OUTPATIENT
Start: 2025-02-14

## 2025-02-14 RX ORDER — ONDANSETRON HYDROCHLORIDE 2 MG/ML
4 INJECTION, SOLUTION INTRAVENOUS ONCE AS NEEDED
Status: COMPLETED | OUTPATIENT
Start: 2025-02-14 | End: 2025-02-14

## 2025-02-14 RX ORDER — OXYCODONE HYDROCHLORIDE 5 MG/1
5 TABLET ORAL EVERY 4 HOURS PRN
Status: DISCONTINUED | OUTPATIENT
Start: 2025-02-14 | End: 2025-02-14 | Stop reason: HOSPADM

## 2025-02-14 RX ORDER — IBUPROFEN 600 MG/1
600 TABLET ORAL EVERY 6 HOURS PRN
Qty: 20 TABLET | Refills: 0 | Status: SHIPPED | OUTPATIENT
Start: 2025-02-14

## 2025-02-14 RX ORDER — HYDRALAZINE HYDROCHLORIDE 20 MG/ML
5 INJECTION INTRAMUSCULAR; INTRAVENOUS EVERY 30 MIN PRN
Status: DISCONTINUED | OUTPATIENT
Start: 2025-02-14 | End: 2025-02-14 | Stop reason: HOSPADM

## 2025-02-14 RX ORDER — ONDANSETRON HYDROCHLORIDE 2 MG/ML
INJECTION, SOLUTION INTRAVENOUS AS NEEDED
Status: DISCONTINUED | OUTPATIENT
Start: 2025-02-14 | End: 2025-02-14

## 2025-02-14 RX ADMIN — DEXAMETHASONE SODIUM PHOSPHATE 8 MG: 4 INJECTION, SOLUTION INTRAMUSCULAR; INTRAVENOUS at 13:19

## 2025-02-14 RX ADMIN — APREPITANT 40 MG: 40 CAPSULE ORAL at 12:40

## 2025-02-14 RX ADMIN — OXYCODONE HYDROCHLORIDE 5 MG: 5 TABLET ORAL at 15:49

## 2025-02-14 RX ADMIN — FENTANYL CITRATE 50 MCG: 50 INJECTION, SOLUTION INTRAMUSCULAR; INTRAVENOUS at 13:15

## 2025-02-14 RX ADMIN — Medication 8 L/MIN: at 14:28

## 2025-02-14 RX ADMIN — SCOLOPAMINE TRANSDERMAL SYSTEM 1 PATCH: 1 PATCH, EXTENDED RELEASE TRANSDERMAL at 12:40

## 2025-02-14 RX ADMIN — MIDAZOLAM HYDROCHLORIDE 2 MG: 1 INJECTION, SOLUTION INTRAMUSCULAR; INTRAVENOUS at 13:06

## 2025-02-14 RX ADMIN — ROCURONIUM BROMIDE 60 MG: 10 INJECTION, SOLUTION INTRAVENOUS at 13:15

## 2025-02-14 RX ADMIN — ONDANSETRON 4 MG: 2 INJECTION, SOLUTION INTRAMUSCULAR; INTRAVENOUS at 15:14

## 2025-02-14 RX ADMIN — PROPOFOL 200 MG: 10 INJECTION, EMULSION INTRAVENOUS at 13:15

## 2025-02-14 RX ADMIN — SUGAMMADEX 200 MG: 100 INJECTION, SOLUTION INTRAVENOUS at 14:14

## 2025-02-14 RX ADMIN — LIDOCAINE HYDROCHLORIDE 100 MG: 20 INJECTION, SOLUTION INTRAVENOUS at 13:15

## 2025-02-14 RX ADMIN — KETOROLAC TROMETHAMINE 30 MG: 30 INJECTION, SOLUTION INTRAMUSCULAR at 14:08

## 2025-02-14 RX ADMIN — SODIUM CHLORIDE, POTASSIUM CHLORIDE, SODIUM LACTATE AND CALCIUM CHLORIDE: 600; 310; 30; 20 INJECTION, SOLUTION INTRAVENOUS at 13:06

## 2025-02-14 RX ADMIN — ROCURONIUM BROMIDE 10 MG: 10 INJECTION, SOLUTION INTRAVENOUS at 14:04

## 2025-02-14 RX ADMIN — Medication 50 MG: at 13:38

## 2025-02-14 RX ADMIN — CEFAZOLIN 3 G: 1 INJECTION, POWDER, FOR SOLUTION INTRAMUSCULAR; INTRAVENOUS at 13:18

## 2025-02-14 RX ADMIN — ONDANSETRON 4 MG: 2 INJECTION, SOLUTION INTRAMUSCULAR; INTRAVENOUS at 14:08

## 2025-02-14 RX ADMIN — FENTANYL CITRATE 50 MCG: 50 INJECTION, SOLUTION INTRAMUSCULAR; INTRAVENOUS at 13:27

## 2025-02-14 ASSESSMENT — COLUMBIA-SUICIDE SEVERITY RATING SCALE - C-SSRS
6. HAVE YOU EVER DONE ANYTHING, STARTED TO DO ANYTHING, OR PREPARED TO DO ANYTHING TO END YOUR LIFE?: NO
2. HAVE YOU ACTUALLY HAD ANY THOUGHTS OF KILLING YOURSELF?: NO
1. IN THE PAST MONTH, HAVE YOU WISHED YOU WERE DEAD OR WISHED YOU COULD GO TO SLEEP AND NOT WAKE UP?: NO

## 2025-02-14 ASSESSMENT — PAIN SCALES - GENERAL
PAINLEVEL_OUTOF10: 3
PAINLEVEL_OUTOF10: 3
PAINLEVEL_OUTOF10: 0 - NO PAIN
PAINLEVEL_OUTOF10: 0 - NO PAIN
PAINLEVEL_OUTOF10: 3
PAINLEVEL_OUTOF10: 0 - NO PAIN
PAINLEVEL_OUTOF10: 3
PAINLEVEL_OUTOF10: 0 - NO PAIN
PAINLEVEL_OUTOF10: 0 - NO PAIN
PAINLEVEL_OUTOF10: 2

## 2025-02-14 ASSESSMENT — PAIN - FUNCTIONAL ASSESSMENT
PAIN_FUNCTIONAL_ASSESSMENT: 0-10

## 2025-02-14 ASSESSMENT — PAIN DESCRIPTION - DESCRIPTORS
DESCRIPTORS: SORE
DESCRIPTORS: SORE

## 2025-02-14 NOTE — ANESTHESIA PREPROCEDURE EVALUATION
Patient: Melanie Walsh    Procedure Information       Date/Time: 02/14/25 1330    Procedure: Laparoscopic Cholecystectomy; Cholangiogram (Abdomen)    Location: [facility] A OR 08 / [facility] A OR    Surgeons: Dallin Dumont MD          40 yo F diet hx controlled GERD, HTN, BMI 47.  Discussed how currently pediatricians recommend to continue to breast feed the baby like normal postoperatively.  Also mentioned the old pediatrician recommendation to pump/dump.  Ultimately, the patient can decide which way she wants to proceed.      Relevant Problems   Anesthesia   (+) PONV (postoperative nausea and vomiting)      Cardiac   (+) Benign essential hypertension   (+) Chronic hypertension      Neuro   (+) Anxiety      GI   (+) Gastroesophageal reflux disease      Liver   (+) Calculus of gallbladder      Endocrine   (+) Morbid (severe) obesity due to excess calories (Multi)      HEENT   (+) Hearing loss   (+) Mixed conductive and sensorineural hearing loss of right ear with unrestricted hearing of left ear      ID   (+) Infection due to fungus      GYN   (+) Polycystic ovary syndrome       Clinical information reviewed:   Tobacco  Allergies  Meds   Med Hx  Surg Hx  OB Status  Fam Hx  Soc   Hx        NPO Detail:  NPO/Void Status  Carbohydrate Drink Given Prior to Surgery? : N  Date of Last Liquid: 02/14/25  Time of Last Liquid: 1015  Date of Last Solid: 02/13/25  Time of Last Solid: 2230  Last Intake Type: Clear fluids  Time of Last Void: 1203         Physical Exam    Airway  Mallampati: III  TM distance: >3 FB  Neck ROM: full     Cardiovascular   Rate: normal     Dental - normal exam     Pulmonary - normal exam     Abdominal            Anesthesia Plan    History of general anesthesia?: yes  History of complications of general anesthesia?: no    ASA 3     general     intravenous induction   Postoperative administration of opioids is intended.  Anesthetic plan and risks discussed with patient.

## 2025-02-14 NOTE — H&P
History Of Present Illness  Melanie Walsh is a 39 y.o. female presenting with 2-year history of postprandial epigastric pain accompanied by bloating nausea and vomiting.  It started during her first pregnancy.  Initially it was occurring on a weekly basis but with some dietary adjustments it has been occurring maybe once a month or so.  She had her last episode on [place] sravani by after eating some heavy foods.  She had an EGD done in  that showed some gastritis and she is on a PPI for that.  She says the pain occasionally goes to her back and shoulder.  She has never had to go to the emergency room for it.  She has a history of hypertension and PCOS.  Surgical history includes cleft palate surgery as a child and then  2 years ago     Past Medical History  Medical History        Past Medical History:   Diagnosis Date    Anxiety 2023    Candidiasis, unspecified 2022     Yeast infection    Cleft palate      Delivery by elective  section ([facility]) 2024    Encounter for immunization       COVID-19 vaccine administered    HL (hearing loss)      Hypertension 2018    Incomplete spontaneous  ([facility]) 2024    Obesity      Personal history of other complications of pregnancy, childbirth and the puerperium 2020     History of spontaneous     Personal history of other diseases of the circulatory system 2022     History of hypertension    Varicella without complication       Varicella without complication            Surgical History  Surgical History         Past Surgical History:   Procedure Laterality Date     SECTION, LOW TRANSVERSE   2022    OTHER SURGICAL HISTORY   2016     Palatoplasty For Cleft Palate    OTHER SURGICAL HISTORY   12/15/2022      section    OTHER SURGICAL HISTORY   2022     Cleft palate repair    RHINOPLASTY        TYMPANOSTOMY TUBE PLACEMENT                Social History  She reports that she has  "never smoked. She has never been exposed to tobacco smoke. She has never used smokeless tobacco. She reports that she does not drink alcohol and does not use drugs.     Family History  Family History          Family History   Problem Relation Name Age of Onset    Diabetes Mother Cynthia      Osteoarthritis Mother Cynthia      Diabetes Father Osmin      Diabetes Maternal Grandfather Sly      Diabetes Maternal Grandmother Cynthia      Hearing loss Maternal Grandmother Cynthia              Allergies  Cayenne, House dust mite, Mold, Ragweed pollen, Penicillin v, and Animal dander     Review of Systems  Constitutional: no weight loss, no fevers, no malaise  HEENT: negative  Neck: negative  Pulmonary: no SOB, no cough  CV: no chest pain, otherwise negative  GI: See HPI  : no hematuria, retention.  MS: no aches/pains  Neurologic: negative  Skin: no rashes, lesions  HEME: no bleeding tendency, no bruising  Psych: no mood issues     Physical Exam  Constitutional: Mild distress.  Alert and oriented x 3.    Skin: non jaundiced  HEENT: normal  Lungs: clear to auscultation  CV: RRR, S1S2, no murmurs  Abdomen: soft, mild discomfort RUQ.  No obvious hernias.  No diffuse peritoneal signs  MS: grossly normal  Neuro: grossly normal     Last Recorded Vitals  Blood pressure 119/77, pulse 88, temperature 36.4 °C (97.5 °F), temperature source Temporal, height 1.702 m (5' 7\"), weight 138 kg (303 lb 12.8 oz).     Relevant Results        Status Exam Begun Exam Ended   Final 3/04/2024 09:35 3/04/2024 10:01      Study Result     Narrative & Impression   STUDY:  Right Upper Quadrant Ultrasound; 3/4/2024 10:02 am.  INDICATION:  Epigastric pain. Nausea. Vomiting.  COMPARISON:  None available.  ACCESSION NUMBER(S):  [id number]  ORDERING CLINICIAN:  RIVKA HODGES  TECHNIQUE:  Ultrasound of the Right Upper Quadrant.  FINDINGS:  LIVER:  The liver measures 18.5 cm and demonstrates increased echogenicity.  Hepatic cyst measuring 1.3 x 0.9 x 1.5 cm.     "   GALLBLADDER:  The gallbladder contains multiple stones. There is no pericholecystic  fluid or wall thickening. Sonographic Crystal's sign is negative.        BILE DUCTS:  The common bile duct measures 0.3 cm. There is no intrahepatic biliary  dilatation.       PANCREAS:  The pancreas is not well seen due to overlying bowel gas.      RIGHT KIDNEY:  The right kidney measures 11.4 cm in length. Renal cortical  echotexture is normal. There is no hydronephrosis. There are no  stones. There are no cysts.  IMPRESSION:  Hepatomegaly with diffuse hepatic steatosis. Hepatic cyst.  Cholelithiasis. No gallbladder wall thickening or biliary dilatation  is appreciated.   Signed by Eleazar Giraldo MD            Assessment/Plan  Impression:  Symptomatic Cholelithiasis     -I carefully explained the pathophysiology of biliary tract disease using terms and pictures  -Rationale for surgical intervention described  -Technique of laparoscopic cholecystectomy explained (both standard and robotic assisted)  -Risks of surgery elucidated (bleeding, infection, bile leak, CBD injury, etc)  -Other options presented (watchful waiting, avoidance of fatty foods)  -All patient questions answered to her satisfaction.  -Patient has indicated desire to pursue surgical intervention  -Office will arrange at patient convenience.

## 2025-02-14 NOTE — ANESTHESIA POSTPROCEDURE EVALUATION
Patient: Melanie Walsh    Procedure Summary       Date: 02/14/25 Room / Location: [facility] OR 08 / [facility]    Anesthesia Start: 1306 Anesthesia Stop: 1432    Procedure: Laparoscopic Cholecystectomy; Cholangiogram (Abdomen) Diagnosis:       Calculus of gallbladder without cholecystitis without obstruction      Biliary colic      (Calculus of gallbladder without cholecystitis without obstruction [K80.20])      (Biliary colic [K80.50])    Surgeons: Dallin Dumont MD Responsible Provider: Med Tomlinson MD    Anesthesia Type: general ASA Status: 3            Anesthesia Type: general    Vitals Value Taken Time   /64 02/14/25 1531   Temp 36.8 °C (98.2 °F) 02/14/25 1428   Pulse 65 02/14/25 1543   Resp 14 02/14/25 1530   SpO2 96 % 02/14/25 1543   Vitals shown include unfiled device data.    Anesthesia Post Evaluation    Patient participation: complete - patient participated  Level of consciousness: awake  Pain management: satisfactory to patient  Airway patency: patent  Cardiovascular status: acceptable and hemodynamically stable  Respiratory status: acceptable and nonlabored ventilation  Hydration status: balanced  Postoperative Nausea and Vomiting: none        No notable events documented.

## 2025-02-14 NOTE — OP NOTE
Laparoscopic Cholecystectomy; Cholangiogram Operative Note     Date: 2025  OR Location: [facility] OR    Name: Melanie Walsh, : 1985, Age: 39 y.o., MRN: [id number], Sex: female    Diagnosis  Pre-op Diagnosis      * Calculus of gallbladder without cholecystitis without obstruction [K80.20]     * Biliary colic [K80.50] Post-op Diagnosis     * Calculus of gallbladder without cholecystitis without obstruction [K80.20]     * Biliary colic [K80.50]     Procedures  Laparoscopic Cholecystectomy; Cholangiogram  71686 - [place] LAPS SURG CHOLECYSTECTOMY W/CHOLANGIOGRAPHY      Surgeons      * Dallin Dumont - Primary    Resident/Fellow/Other Assistant:  Surgeons and Role:  * No surgeons found with a matching role *  Pgy 2  Staff:   Circulator: Eleonora Lewis Person: Mey Puckett Scrub: Doris    Anesthesia Staff: Anesthesiologist: Med Tomlinson MD  C-AA: ED [place]    Procedure Summary  Anesthesia: General  ASA: III  Estimated Blood Loss: 5mL  Intra-op Medications:   Administrations occurring from 1330 to 1500 on 25:   Medication Name Total Dose   ioversol (Optiray-320) injection 10 mL   BUPivacaine HCl (Marcaine) 0.5 % (5 mg/mL) injection 16 mL   sodium chloride 0.9 % irrigation solution 1,000 mL   ketamine injection 50 mg/ 5 mL (10 mg/mL) 50 mg   ketorolac (Toradol) injection 30 mg 30 mg   lactated Ringer's infusion Cannot be calculated   ondansetron (Zofran) 2 mg/mL injection 4 mg   rocuronium (ZeMuron) 50 mg/5 mL injection 10 mg   sugammadex (Bridion) 200 mg/2 mL injection 200 mg              Anesthesia Record               Intraprocedure I/O Totals          Intake    lactated Ringer's 800.00 mL    Total Intake 800 mL       Output    Est. Blood Loss 15 mL    Total Output 15 mL       Net    Net Volume 785 mL          Specimen:   ID Type Source Tests Collected by Time   1 : GALLBLADDER Tissue GALLBLADDER CHOLECYSTECTOMY SURGICAL PATHOLOGY EXAM Dallin Dumont MD 2025 1405                 Drains  and/or Catheters:   [REMOVED] NG/OG/Feeding Tube OG - [place] sump 16 Fr Center mouth (Removed)       Tourniquet Times:         Implants:     Findings: Normal cholangiogram.  Chronic cholecystitis with stones in the neck    Indications: Melanie Walsh is an 39 y.o. female who is having surgery for Calculus of gallbladder without cholecystitis without obstruction [K80.20]  Biliary colic [K80.50].     The patient was seen in the preoperative area. The risks, benefits, complications, treatment options, non-operative alternatives, expected recovery and outcomes were discussed with the patient. The possibilities of reaction to medication, pulmonary aspiration, injury to surrounding structures, bleeding, recurrent infection, the need for additional procedures, failure to diagnose a condition, and creating a complication requiring transfusion or operation were discussed with the patient. The patient concurred with the proposed plan, giving informed consent.  The site of surgery was properly noted/marked if necessary per policy. The patient has been actively warmed in preoperative area. Preoperative antibiotics have been ordered and given within 1 hours of incision. Venous thrombosis prophylaxis have been ordered including bilateral sequential compression devices    Procedure Details:  Description:  Patient was brought to the operating room placed supine on the table.  Timeout was performed which confirmed patient and procedure.  Perioperative antibiotics were administered.  Gen. anesthesia was administered through an endotracheal tube.  The abdomen was prepped and draped sterilely.    I injected half percent Marcaine and then made a sharp supraumbilical incision with the knife.  Sharp incision was made in the fascia.  The peritoneal cavity was entered under direct visualization and a Andrea port was placed.  The abdomen was insufflated and the patient was placed in steep reverse Trendelenburg.  A 5 mm 30° camera was  introduced.  I placed a right upper quadrant 5 mm port and another 5 mL port in the midline subxiphoid area.  The gallbladder was visualized.  It was grasped and retracted superiorly into the right.  Meticulous dissection was then performed in the area of Calot triangle.  Critical view was achieved.  Posterior cystic plate visualized.  The cystic artery and cystic duct were skeletonized.  The artery was divided between hemoclips.  Endo Clip placed on the gallbladder side and then made a ductotomy with EndoShears.  Ranfac cholangiocatheter was inserted through a separate angiocatheter sheath.  Cholangiogram was obtained using C-arm fluoroscopy.  The anatomy was noted to be normal.  No obvious filling defects seen. Ranfac catheter removed.  I placed 3 clips on the distal cystic duct and one toward the gallbladder and divided with EndoShears.  The gallbladder was then dissected atraumatically out of the liver bed with hook cautery.  Once it was liberated it was placed in the Endo Catch bag and brought out through the umbilicus.  Liver bed was inspected and noted to be hemostatic.  Clips were noted to be in good position.  Gentle irrigation was performed.  The effluent was noted to be clear.  The ports were removed under direct visualization.  The abdomen was desufflated.  The fascia at the umbilicus was closed with 0 Vicryl.  Wounds were irrigated.  Skin incisions were closed with 4-0 Biosyn and Dermabond.  Patient was ultimately extubated and transferred to the recovery room in satisfactory condition.    Complications:  None; patient tolerated the procedure well.    Disposition: PACU - hemodynamically stable.  Condition: stable                 Additional Details:     Attending Attestation: I was present for the entire procedure.    Dallin Dumont  Phone Number: [phone]

## 2025-02-14 NOTE — NURSING NOTE
1615- assumed care of patient in phase 2   1620- patient  Everett at bedside   1631- discharge teaching completed with patient Melanie and Everett, patient educated on medications, weight restrictions and infection signs and symptoms as well as prevention   1641- patient dressed with 's help   1646- IV removed and  went to pull car around

## 2025-02-17 ASSESSMENT — PAIN SCALES - GENERAL: PAINLEVEL_OUTOF10: 0 - NO PAIN

## 2025-02-18 ENCOUNTER — APPOINTMENT (OUTPATIENT)
Dept: DERMATOLOGY | Facility: CLINIC | Age: 40
End: 2025-02-18
Payer: COMMERCIAL

## 2025-02-21 LAB
LABORATORY COMMENT REPORT: NORMAL
PATH REPORT.FINAL DX SPEC: NORMAL
PATH REPORT.GROSS SPEC: NORMAL
PATH REPORT.RELEVANT HX SPEC: NORMAL
PATH REPORT.TOTAL CANCER: NORMAL
RESIDENT REVIEW: NORMAL

## 2025-02-24 ENCOUNTER — APPOINTMENT (OUTPATIENT)
Dept: OTOLARYNGOLOGY | Facility: CLINIC | Age: 40
End: 2025-02-24
Payer: COMMERCIAL

## 2025-03-03 ENCOUNTER — OFFICE VISIT (OUTPATIENT)
Dept: SURGERY | Facility: CLINIC | Age: 40
End: 2025-03-03
Payer: COMMERCIAL

## 2025-03-03 VITALS
OXYGEN SATURATION: 96 % | BODY MASS INDEX: 45.99 KG/M2 | TEMPERATURE: 99.5 F | SYSTOLIC BLOOD PRESSURE: 122 MMHG | DIASTOLIC BLOOD PRESSURE: 85 MMHG | WEIGHT: 293 LBS | HEART RATE: 91 BPM | HEIGHT: 67 IN

## 2025-03-03 DIAGNOSIS — Z09 SURGERY FOLLOW-UP: Primary | ICD-10-CM

## 2025-03-03 PROCEDURE — 99211 OFF/OP EST MAY X REQ PHY/QHP: CPT | Performed by: SURGERY

## 2025-03-03 PROCEDURE — 1036F TOBACCO NON-USER: CPT | Performed by: SURGERY

## 2025-03-03 PROCEDURE — 3074F SYST BP LT 130 MM HG: CPT | Performed by: SURGERY

## 2025-03-03 PROCEDURE — 3008F BODY MASS INDEX DOCD: CPT | Performed by: SURGERY

## 2025-03-03 PROCEDURE — 3079F DIAST BP 80-89 MM HG: CPT | Performed by: SURGERY

## 2025-03-03 ASSESSMENT — PAIN SCALES - GENERAL: PAINLEVEL_OUTOF10: 0-NO PAIN

## 2025-03-03 NOTE — PROGRESS NOTES
Assessment/Plan   Excellent recovery following recent laparoscopic cholecystectomy  -We reviewed intraoperative findings and final pathology report  -Patient encouraged to resume regular activities including exercise as tolerated  -Avoid greasy and fatty foods first couple months after surgery  -Follow-up with me as needed    Subjective   Status post laparoscopic cholecystectomy.  Feels well.  Much improved compared to prior surgery       Objective     Physical Exam  NAD  A&Ox3  Non icteric  CTA  RR  Abdomen soft min tender. Wounds clean, intact  Extremities warm, well perfused         Relevant Results      No results found for this or any previous visit (from the past 24 hours).       Component  Resulting Agency   FINAL DIAGNOSIS   A. Gallbladder, cholecystectomy:   - Chronic cholecystitis with cholelithiasis and cholesterolosis.      Electronically signed by Candice Marcano MD on 2/21/2025 at 1032      [facility]   By the signature on this report, the individual or group liste            I spent 25 minutes in the professional and overall care of this patient.      Dallin Dumont MD       chlorhexidine

## 2025-04-07 ENCOUNTER — APPOINTMENT (OUTPATIENT)
Dept: NUTRITION | Facility: HOSPITAL | Age: 40
End: 2025-04-07
Payer: COMMERCIAL

## 2025-04-09 NOTE — LETTER
March 3, 2025     Asia Tran, APRN-CNP  [address]  [address]  [facility] [address]    Patient: Melanie Walsh   YOB: 1985   Date of Visit: 3/3/2025       Dear Dr. Asia Tran, APRN-CNP:    Thank you for referring Melanie Walsh to me for evaluation. Below are my notes for this consultation.  If you have questions, please do not hesitate to call me. I look forward to following your patient along with you.       Sincerely,     Dallin Dumont MD      CC: No Recipients  ______________________________________________________________________________________    Assessment/Plan  Excellent recovery following recent laparoscopic cholecystectomy  -We reviewed intraoperative findings and final pathology report  -Patient encouraged to resume regular activities including exercise as tolerated  -Avoid greasy and fatty foods first couple months after surgery  -Follow-up with me as needed    Subjective  Status post laparoscopic cholecystectomy.  Feels well.  Much improved compared to prior surgery       Objective    Physical Exam  NAD  A&Ox3  Non icteric  CTA  RR  Abdomen soft min tender. Wounds clean, intact  Extremities warm, well perfused         Relevant Results      No results found for this or any previous visit (from the past 24 hours).       Component  Resulting Agency   FINAL DIAGNOSIS   A. Gallbladder, cholecystectomy:   - Chronic cholecystitis with cholelithiasis and cholesterolosis.      Electronically signed by Candice Marcano MD on 2/21/2025 at 1032      [facility]   By the signature on this report, the individual or group liste            I spent 25 minutes in the professional and overall care of this patient.      Dallin Dumont MD      
negative

## 2025-04-10 NOTE — ANESTHESIA PROCEDURE NOTES
Airway  Date/Time: 2/14/2025 1:17 PM  Urgency: elective    Airway not difficult    Staffing  Performed: ED   Authorized by: Med Tomlinson MD    Performed by: ED [place]  Patient location during procedure: OR    Indications and Patient Condition  Indications for airway management: anesthesia and airway protection  Spontaneous Ventilation: absent  Sedation level: deep  Preoxygenated: yes  Patient position: sniffing  MILS not maintained throughout  Mask difficulty assessment: 1 - vent by mask  Planned trial extubation    Final Airway Details  Final airway type: endotracheal airway      Successful airway: ETT  Cuffed: yes   Successful intubation technique: direct laryngoscopy  Endotracheal tube insertion site: oral  Blade: Kimberly  Blade size: #3  ETT size (mm): 7.0  Cormack-Lehane Classification: grade IIa - partial view of glottis  Placement verified by: chest auscultation, capnometry and palpation of cuff   Measured from: teeth  ETT to teeth (cm): 22  Number of attempts at approach: 1           3

## 2025-04-18 DIAGNOSIS — K21.9 GASTROESOPHAGEAL REFLUX DISEASE, UNSPECIFIED WHETHER ESOPHAGITIS PRESENT: ICD-10-CM

## 2025-04-18 RX ORDER — FAMOTIDINE 40 MG/1
40 TABLET, FILM COATED ORAL NIGHTLY PRN
Qty: 30 TABLET | Refills: 3 | Status: SHIPPED | OUTPATIENT
Start: 2025-04-18 | End: 2026-04-18

## 2025-04-28 ASSESSMENT — PROMIS GLOBAL HEALTH SCALE
RATE_GENERAL_HEALTH: VERY GOOD
RATE_AVERAGE_FATIGUE: MILD
RATE_SOCIAL_SATISFACTION: VERY GOOD
RATE_PHYSICAL_HEALTH: VERY GOOD
RATE_AVERAGE_PAIN: 0
RATE_MENTAL_HEALTH: VERY GOOD
CARRYOUT_SOCIAL_ACTIVITIES: VERY GOOD
EMOTIONAL_PROBLEMS: RARELY
RATE_QUALITY_OF_LIFE: VERY GOOD
CARRYOUT_PHYSICAL_ACTIVITIES: COMPLETELY

## 2025-05-02 DIAGNOSIS — H65.04 RECURRENT ACUTE SEROUS OTITIS MEDIA OF RIGHT EAR: ICD-10-CM

## 2025-05-02 NOTE — PROGRESS NOTES
Patient called RN stating her right ear is painful and draining a yellowish/white color. Believes she has an infection and is requesting medication until her follow up with Dr. Fitzpatrick on 5/5/25. Will alert MD.

## 2025-05-05 ENCOUNTER — OFFICE VISIT (OUTPATIENT)
Dept: PRIMARY CARE | Facility: CLINIC | Age: 40
End: 2025-05-05
Payer: COMMERCIAL

## 2025-05-05 ENCOUNTER — HOSPITAL ENCOUNTER (OUTPATIENT)
Facility: CLINIC | Age: 40
Setting detail: OUTPATIENT SURGERY
End: 2025-05-05
Attending: OTOLARYNGOLOGY | Admitting: OTOLARYNGOLOGY
Payer: COMMERCIAL

## 2025-05-05 ENCOUNTER — APPOINTMENT (OUTPATIENT)
Dept: OTOLARYNGOLOGY | Facility: CLINIC | Age: 40
End: 2025-05-05
Payer: COMMERCIAL

## 2025-05-05 VITALS — HEIGHT: 67 IN | BODY MASS INDEX: 45.99 KG/M2 | WEIGHT: 293 LBS

## 2025-05-05 VITALS
DIASTOLIC BLOOD PRESSURE: 86 MMHG | OXYGEN SATURATION: 97 % | HEART RATE: 103 BPM | SYSTOLIC BLOOD PRESSURE: 122 MMHG | BODY MASS INDEX: 45.99 KG/M2 | TEMPERATURE: 97.7 F | HEIGHT: 67 IN | RESPIRATION RATE: 18 BRPM | WEIGHT: 293 LBS

## 2025-05-05 DIAGNOSIS — E28.2 PCOS (POLYCYSTIC OVARIAN SYNDROME): ICD-10-CM

## 2025-05-05 DIAGNOSIS — H90.11 CONDUCTIVE HEARING LOSS OF RIGHT EAR WITH UNRESTRICTED HEARING OF LEFT EAR: ICD-10-CM

## 2025-05-05 DIAGNOSIS — Z00.00 ANNUAL PHYSICAL EXAM: Primary | ICD-10-CM

## 2025-05-05 DIAGNOSIS — H61.23 BILATERAL IMPACTED CERUMEN: ICD-10-CM

## 2025-05-05 DIAGNOSIS — I10 BENIGN ESSENTIAL HYPERTENSION: ICD-10-CM

## 2025-05-05 DIAGNOSIS — Z01.818 PREOPERATIVE CLEARANCE: ICD-10-CM

## 2025-05-05 DIAGNOSIS — H72.01 CENTRAL PERFORATION OF TYMPANIC MEMBRANE OF RIGHT EAR: Primary | ICD-10-CM

## 2025-05-05 DIAGNOSIS — H71.91 CHOLESTEATOMA OF RIGHT EAR: ICD-10-CM

## 2025-05-05 PROBLEM — R53.82 CHRONIC FATIGUE: Status: RESOLVED | Noted: 2023-09-02 | Resolved: 2025-05-05

## 2025-05-05 PROBLEM — F41.9 ANXIETY: Status: RESOLVED | Noted: 2023-09-02 | Resolved: 2025-05-05

## 2025-05-05 PROBLEM — R60.0 EDEMA, LEG: Status: RESOLVED | Noted: 2023-09-02 | Resolved: 2025-05-05

## 2025-05-05 PROBLEM — B49 INFECTION DUE TO FUNGUS: Status: RESOLVED | Noted: 2024-06-20 | Resolved: 2025-05-05

## 2025-05-05 PROCEDURE — 3079F DIAST BP 80-89 MM HG: CPT | Performed by: NURSE PRACTITIONER

## 2025-05-05 PROCEDURE — 3074F SYST BP LT 130 MM HG: CPT | Performed by: NURSE PRACTITIONER

## 2025-05-05 PROCEDURE — 3008F BODY MASS INDEX DOCD: CPT | Performed by: NURSE PRACTITIONER

## 2025-05-05 PROCEDURE — 99395 PREV VISIT EST AGE 18-39: CPT | Performed by: NURSE PRACTITIONER

## 2025-05-05 RX ORDER — LABETALOL 200 MG/1
1 TABLET, FILM COATED ORAL DAILY
Qty: 90 TABLET | Refills: 1 | Status: SHIPPED | OUTPATIENT
Start: 2025-05-05 | End: 2025-11-01

## 2025-05-05 RX ORDER — METFORMIN HYDROCHLORIDE 1000 MG/1
1000 TABLET ORAL
Qty: 90 TABLET | Refills: 1 | Status: SHIPPED | OUTPATIENT
Start: 2025-05-05 | End: 2026-06-09

## 2025-05-05 RX ORDER — CIPROFLOXACIN AND DEXAMETHASONE 3; 1 MG/ML; MG/ML
4 SUSPENSION/ DROPS AURICULAR (OTIC) 2 TIMES DAILY
Qty: 7.5 ML | Refills: 1 | Status: SHIPPED | OUTPATIENT
Start: 2025-05-05 | End: 2025-05-26

## 2025-05-05 RX ORDER — CIPROFLOXACIN AND DEXAMETHASONE 3; 1 MG/ML; MG/ML
4 SUSPENSION/ DROPS AURICULAR (OTIC) 2 TIMES DAILY
Qty: 7.5 ML | Refills: 0 | Status: SHIPPED | OUTPATIENT
Start: 2025-05-05 | End: 2025-05-12

## 2025-05-05 ASSESSMENT — ENCOUNTER SYMPTOMS
CARDIOVASCULAR NEGATIVE: 1
ALLERGIC/IMMUNOLOGIC NEGATIVE: 1
CONSTITUTIONAL NEGATIVE: 1
GASTROINTESTINAL NEGATIVE: 1
MUSCULOSKELETAL NEGATIVE: 1
PSYCHIATRIC NEGATIVE: 1
NEUROLOGICAL NEGATIVE: 1
EYES NEGATIVE: 1
ENDOCRINE NEGATIVE: 1
SINUS PRESSURE: 1
RESPIRATORY NEGATIVE: 1
HEMATOLOGIC/LYMPHATIC NEGATIVE: 1
SORE THROAT: 1

## 2025-05-05 ASSESSMENT — PATIENT HEALTH QUESTIONNAIRE - PHQ9
1. LITTLE INTEREST OR PLEASURE IN DOING THINGS: NOT AT ALL
2. FEELING DOWN, DEPRESSED OR HOPELESS: NOT AT ALL
1. LITTLE INTEREST OR PLEASURE IN DOING THINGS: NOT AT ALL
SUM OF ALL RESPONSES TO PHQ9 QUESTIONS 1 AND 2: 0
2. FEELING DOWN, DEPRESSED OR HOPELESS: NOT AT ALL
SUM OF ALL RESPONSES TO PHQ9 QUESTIONS 1 AND 2: 0

## 2025-05-05 ASSESSMENT — PAIN SCALES - GENERAL: PAINLEVEL_OUTOF10: 1

## 2025-05-05 NOTE — PROGRESS NOTES
Reason for Consult:  Follow-up (6 month follow up visit right ear pain perforation in ear.)     Subjective   History Of Present Illness:  Melanie Walsh is a 39 y.o. female with history of cleft palate with multiple PE tubes placed bilaterally. She had a previous right sided tympanoplasty. Dr. Daly saw her in  and noticed a right sided residual perforation. At the time, her hearing was not significantly worse and they elected to do observation. Last week she started to have drainage out of the right ear and was referred to me to establish care.     Past Medical History:  She has a past medical history of Anxiety (2023), Candidiasis, unspecified (2022), Cleft palate, Delivery by elective  section ([facility]) (2024), Encounter for immunization, GERD (gastroesophageal reflux disease), HL (hearing loss), Hypertension (2018), Incomplete spontaneous  ([facility]) (2024), Obesity, Personal history of other complications of pregnancy, childbirth and the puerperium (2020), Personal history of other diseases of the circulatory system (2022), PONV (postoperative nausea and vomiting), Seasonal allergies, and Varicella without complication.    Surgical History:  She has a past surgical history that includes Other surgical history (2016); Other surgical history (12/15/2022); Other surgical history (2022);  section, low transverse (2022); Tympanostomy tube placement; and Rhinoplasty.     Social History:  She reports that she has never smoked. She has never been exposed to tobacco smoke. She has never used smokeless tobacco. She reports that she does not drink alcohol and does not use drugs.    Family History:  family history includes Diabetes in her father, maternal grandfather, maternal grandmother, and mother; Hearing loss in her maternal grandmother; Osteoarthritis in her mother.     Medications:  Current Outpatient Medications   Medication  "Instructions    cetirizine (ZYRTEC) 10 mg capsule Daily    cholecalciferol, vitamin D3, (VITAMIN D3 ORAL) Take by mouth.    EPINEPHrine (EPIPEN) 0.3 mg, intramuscular, As needed, Inject into UPPER, OUTER THIGH. Call 911 after use.    famotidine (PEPCID) 40 mg, oral, Nightly PRN    ibuprofen 600 mg, oral, Every 6 hours PRN    labetalol (NORMODYNE) 200 mg, oral, Daily    metFORMIN (GLUCOPHAGE) 1,000 mg, oral, Daily with breakfast    norethindrone (MICRONOR) 0.35 mg, oral, Daily      Allergies:  Cayenne, House dust mite, Mold, Ragweed pollen, Penicillin v, and Animal dander    Review of Systems:   A comprehensive 10-point review of systems was obtained including constitutional, neurological, HEENT, pulmonary, cardiovascular, genito-urinary, and other pertinent systems and was negative except as noted in the HPI.     Objective   Physical Exam:  Last Recorded Vitals: Height 1.702 m (5' 7\"), weight 138 kg (305 lb), last menstrual period 04/28/2025.    On physical exam, the patient is a well-nourished, well-developed patient, in no acute distress, able to communicate without assistance in English language. Head and face is atraumatic and normocephalic. Salivary glands are intact. Facial strength is symmetrical bilaterally.       On ear examination:  Right ear: The patient has a 30% tympanic perforation inferiorly with skin debris and tracking under the drum. Debris was removed. There is a forming cholesteatoma   BC>AC  Left ear: The patient has cerumen impaction which was removed. The tympanic membrane is intact.  AC>BC  The [place]u is right    On vestibular exam, the patient has no spontaneous nystagmus, no headshake nystagmus, no head-thrust nystagmus, and no nystagmus on hyperventilation or Valsalva maneuvers. Nicholas-Hallpike maneuver is negative bilaterally.       On neuro exam, the patient is alert and oriented x3, cranial nerves are grossly intact, cerebellar exam is normal.      The rest of the exam, including anterior " rhinoscopy, oropharyngeal exam, neck exam, and cardiovascular exam, were normal including no palpable lymphadenopathies, thyroid in the midline position, normal pulses, and normal chest excursion.       Reviewed Results:  Audiology Testing:  I personally reviewed the audiogram from 07/2024 that showed:   Right ear: moderate conductive hearing loss with 30db of air-bone gap. Discrimination: 84%   Left ear: normal hearing . Discrimination: 96%      Imaging:  None      Procedure:  None    Assessment/Plan     1. Central perforation of tympanic membrane of right ear    2. Conductive hearing loss of right ear with unrestricted hearing of left ear    3. Cholesteatoma of right ear    4. Bilateral impacted cerumen    5. Preoperative clearance        In summary, Melanie Walsh is a 39 y.o. female with history of cleft palate and multiple PE tube sets placed. She has a right sided unstable perforation with a forming cholesteatoma heading through the perforation in the undersurface of the ear drum. We discussed endoscopic approach vs postauricular and she elected to proceed endoscopically.     The risks, indications, alternatives and complications of  doing a right sided endoscopic tympanoplasty with antrotomy, possible ossicular chain reconstruction, and cartilage graft were discussed with the patient and family.  They elected to proceed. We will schedule this in the near future.        ____________________________________________________  Cameron Fitzpatrick MD  Professor and Chief   Otology/Neurotology/Lateral Skull-Base Surgery   [facility]    Scribe Attestation  By signing my name below, I, Sammy Lugo, Scribe   attest that this documentation has been prepared under the direction and in the presence of Cameron Gonzalez MD.

## 2025-05-05 NOTE — PROGRESS NOTES
"Chief Complaint  Melanie Walsh is a 39 y.o. female presenting for \"Annual Exam (Wearing mask -has ear infection and is seeing ENT today).\"    HPI     Melanie Walsh is a 39 y.o. female presenting for her annual wellness, has an ear infection, would like to wait to see ENT today at 11 for treatment on that, needs labs and refill.  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    Female infertility 09/02/2023    Hearing loss 09/02/2023    History of vitamin D deficiency 09/02/2023    Mixed conductive and sensorineural hearing loss of right ear with unrestricted hearing of left ear 09/02/2023    Recurrent acute serous otitis media of right ear 09/02/2023    Allergic rhinitis 09/02/2023    Morbid (severe) obesity due to excess calories (Multi) 11/15/2022    Polycystic ovary syndrome 03/10/2022    Biliary colic 01/02/2025        Medications  Current Outpatient Medications   Medication Instructions    cetirizine (ZYRTEC) 10 mg capsule Daily    cholecalciferol, vitamin D3, (VITAMIN D3 ORAL) Take by mouth.    ciprofloxacin-dexamethasone (CiproDEX) otic suspension 4 drops, Right Ear, 2 times daily    ciprofloxacin-dexamethasone (CiproDEX) otic suspension 4 drops, Right Ear, 2 times daily    EPINEPHrine (EPIPEN) 0.3 mg, intramuscular, As needed, Inject into UPPER, OUTER THIGH. Call 911 after use.    famotidine (PEPCID) 40 mg, oral, Nightly PRN    ibuprofen 600 mg, oral, " Every 6 hours PRN    labetalol (NORMODYNE) 200 mg, oral, Daily    metFORMIN (GLUCOPHAGE) 1,000 mg, oral, Daily with breakfast    norethindrone (MICRONOR) 0.35 mg, oral, Daily        Surgical History  She has a past surgical history that includes Other surgical history (2016); Other surgical history (12/15/2022); Other surgical history (2022);  section, low transverse (2022); Tympanostomy tube placement; and Rhinoplasty.     Social History  She reports that she has never smoked. She has never been exposed to tobacco smoke. She has never used smokeless tobacco. She reports that she does not drink alcohol and does not use drugs.    Family History  Family History[1]     Allergies  Cayenne, House dust mite, Mold, Ragweed pollen, Penicillin v, and Animal dander    ROS  Review of Systems   Constitutional: Negative.    HENT:  Positive for ear pain, sinus pressure and sore throat.    Eyes: Negative.    Respiratory: Negative.     Cardiovascular: Negative.    Gastrointestinal: Negative.    Endocrine: Negative.    Genitourinary: Negative.    Musculoskeletal: Negative.    Skin: Negative.    Allergic/Immunologic: Negative.    Neurological: Negative.    Hematological: Negative.    Psychiatric/Behavioral: Negative.          Last Recorded Vitals  /86 (BP Location: Left arm, Patient Position: Sitting, BP Cuff Size: Adult)   Pulse 103   Temp 36.5 °C (97.7 °F)   Resp 18   Wt 138 kg (305 lb)   SpO2 97%     Physical Exam  Vitals and nursing note reviewed.   Constitutional:       Appearance: Normal appearance.   HENT:      Head: Normocephalic and atraumatic.      Right Ear: Tympanic membrane, ear canal and external ear normal.      Left Ear: Tympanic membrane, ear canal and external ear normal.      Nose: Nose normal.      Mouth/Throat:      Mouth: Mucous membranes are moist.      Pharynx: Oropharynx is clear.   Eyes:      Extraocular Movements: Extraocular movements intact.      Conjunctiva/sclera:  Conjunctivae normal.      Pupils: Pupils are equal, round, and reactive to light.   Neck:      Thyroid: No thyromegaly.   Cardiovascular:      Rate and Rhythm: Normal rate and regular rhythm.      Pulses: Normal pulses.      Heart sounds: Normal heart sounds, S1 normal and S2 normal.   Pulmonary:      Effort: Pulmonary effort is normal.      Breath sounds: Normal breath sounds. No wheezing or rhonchi.   Abdominal:      General: Bowel sounds are normal.      Palpations: Abdomen is soft. There is no mass.      Tenderness: There is no abdominal tenderness. There is no guarding.   Genitourinary:     Comments: Not examined  Musculoskeletal:         General: Normal range of motion.      Cervical back: Normal range of motion.      Right lower leg: No edema.      Left lower leg: No edema.   Lymphadenopathy:      Cervical: No cervical adenopathy.   Skin:     General: Skin is warm and dry.      Capillary Refill: Capillary refill takes less than 2 seconds.      Findings: No rash.   Neurological:      General: No focal deficit present.      Mental Status: She is alert and oriented to person, place, and time. Mental status is at baseline.      Cranial Nerves: Cranial nerves 2-12 are intact. No cranial nerve deficit.      Sensory: Sensation is intact.      Motor: Motor function is intact.      Coordination: Coordination is intact.      Gait: Gait is intact.   Psychiatric:         Mood and Affect: Mood normal.         Behavior: Behavior normal.         Thought Content: Thought content normal.         Judgment: Judgment normal.         Relevant Results      Assessment/Plan   Melanie was seen today for annual exam.  Diagnoses and all orders for this visit:  Annual physical exam (Primary)  -     Comprehensive Metabolic Panel; Future  -     Lipid Panel; Future  -     Comprehensive Metabolic Panel  -     Lipid Panel  Benign essential hypertension  -     labetalol (Normodyne) 200 mg tablet; Take 1 tablet (200 mg) by mouth once  daily.  PCOS (polycystic ovarian syndrome)  -     metFORMIN (Glucophage) 1,000 mg tablet; Take 1 tablet (1,000 mg) by mouth once daily with breakfast.          COUNSELING      Medication education:              Education:  The patient is counseled regarding potential side-effects of any and all new medications             Understanding:  Patient expressed understanding             Adherence:  No barriers to adherence identified        Asia Tran, APRN-CNP              [1]   Family History  Problem Relation Name Age of Onset    Diabetes Mother Cynthia     Osteoarthritis Mother Cynthia     Diabetes Father Osmin     Diabetes Maternal Grandfather Sly     Diabetes Maternal Grandmother Cynthia     Hearing loss Maternal Grandmother Cynthia

## 2025-05-05 NOTE — LETTER
May 10, 2025     Asia Tran, APRN-CNP  [address]  [address]  [place] [facility] [address]    Patient: Melanie Walsh   YOB: 1985   Date of Visit: 2025       Dear Dr. Asia rTan, APRN-CNP:    Thank you for referring Melanie Walsh to me for evaluation. Below are my notes for this consultation.  If you have questions, please do not hesitate to call me. I look forward to following your patient along with you.       Sincerely,     Cameron Gonzalez MD      CC: No Recipients  ______________________________________________________________________________________            Reason for Consult:  Follow-up (6 month follow up visit right ear pain perforation in ear.)     Subjective  History Of Present Illness:  Melanie Walsh is a 39 y.o. female with history of cleft palate with multiple PE tubes placed bilaterally. She had a previous right sided tympanoplasty. Dr. Daly saw her in  and noticed a right sided residual perforation. At the time, her hearing was not significantly worse and they elected to do observation. Last week she started to have drainage out of the right ear and was referred to me to establish care.     Past Medical History:  She has a past medical history of Anxiety (2023), Candidiasis, unspecified (2022), Cleft palate, Delivery by elective  section ([facility]) (2024), Encounter for immunization, GERD (gastroesophageal reflux disease), HL (hearing loss), Hypertension (2018), Incomplete spontaneous  ([facility]) (2024), Obesity, Personal history of other complications of pregnancy, childbirth and the puerperium (2020), Personal history of other diseases of the circulatory system (2022), PONV (postoperative nausea and vomiting), Seasonal allergies, and Varicella without complication.    Surgical History:  She has a past surgical history that includes Other surgical history (2016); Other surgical history  "(12/15/2022); Other surgical history (2022);  section, low transverse (2022); Tympanostomy tube placement; and Rhinoplasty.     Social History:  She reports that she has never smoked. She has never been exposed to tobacco smoke. She has never used smokeless tobacco. She reports that she does not drink alcohol and does not use drugs.    Family History:  family history includes Diabetes in her father, maternal grandfather, maternal grandmother, and mother; Hearing loss in her maternal grandmother; Osteoarthritis in her mother.     Medications:  Current Outpatient Medications   Medication Instructions   • cetirizine (ZYRTEC) 10 mg capsule Daily   • cholecalciferol, vitamin D3, (VITAMIN D3 ORAL) Take by mouth.   • EPINEPHrine (EPIPEN) 0.3 mg, intramuscular, As needed, Inject into UPPER, OUTER THIGH. Call 911 after use.   • famotidine (PEPCID) 40 mg, oral, Nightly PRN   • ibuprofen 600 mg, oral, Every 6 hours PRN   • labetalol (NORMODYNE) 200 mg, oral, Daily   • metFORMIN (GLUCOPHAGE) 1,000 mg, oral, Daily with breakfast   • norethindrone (MICRONOR) 0.35 mg, oral, Daily      Allergies:  Cayenne, House dust mite, Mold, Ragweed pollen, Penicillin v, and Animal dander    Review of Systems:   A comprehensive 10-point review of systems was obtained including constitutional, neurological, HEENT, pulmonary, cardiovascular, genito-urinary, and other pertinent systems and was negative except as noted in the HPI.     Objective  Physical Exam:  Last Recorded Vitals: Height 1.702 m (5' 7\"), weight 138 kg (305 lb), last menstrual period 2025.    On physical exam, the patient is a well-nourished, well-developed patient, in no acute distress, able to communicate without assistance in English language. Head and face is atraumatic and normocephalic. Salivary glands are intact. Facial strength is symmetrical bilaterally.       On ear examination:  Right ear: The patient has a 30% tympanic perforation inferiorly " with skin debris and tracking under the drum. Debris was removed. There is a forming cholesteatoma   BC>AC  Left ear: The patient has cerumen impaction which was removed. The tympanic membrane is intact.  AC>BC  The Ow[place]u is right    On vestibular exam, the patient has no spontaneous nystagmus, no headshake nystagmus, no head-thrust nystagmus, and no nystagmus on hyperventilation or Valsalva maneuvers. [place]-Hallpike maneuver is negative bilaterally.       On neuro exam, the patient is alert and oriented x3, cranial nerves are grossly intact, cerebellar exam is normal.      The rest of the exam, including anterior rhinoscopy, oropharyngeal exam, neck exam, and cardiovascular exam, were normal including no palpable lymphadenopathies, thyroid in the midline position, normal pulses, and normal chest excursion.       Reviewed Results:  Audiology Testing:  I personally reviewed the audiogram from 07/2024 that showed:   Right ear: moderate conductive hearing loss with 30db of air-bone gap. Discrimination: 84%   Left ear: normal hearing . Discrimination: 96%      Imaging:  None      Procedure:  None    Assessment/Plan    1. Central perforation of tympanic membrane of right ear    2. Conductive hearing loss of right ear with unrestricted hearing of left ear    3. Cholesteatoma of right ear    4. Bilateral impacted cerumen    5. Preoperative clearance        In summary, Melanie Walsh is a 39 y.o. female with history of cleft palate and multiple PE tube sets placed. She has a right sided unstable perforation with a forming cholesteatoma heading through the perforation in the undersurface of the ear drum. We discussed endoscopic approach vs postauricular and she elected to proceed endoscopically.     The risks, indications, alternatives and complications of  doing a right sided endoscopic tympanoplasty with antrotomy, possible ossicular chain reconstruction, and cartilage graft were discussed with the patient and family.   They elected to proceed. We will schedule this in the near future.        ____________________________________________________  Cameron Fitzpatrick MD  Professor and Chief   Otology/Neurotology/Lateral Skull-Base Surgery   [facility]    Scribe Attestation  By signing my name below, I, Sammy Lugo, Scribe   attest that this documentation has been prepared under the direction and in the presence of Cameron Gonzalez MD.

## 2025-05-12 LAB
ALBUMIN SERPL-MCNC: 4.2 G/DL (ref 3.6–5.1)
ALP SERPL-CCNC: 80 U/L (ref 31–125)
ALT SERPL-CCNC: 13 U/L (ref 6–29)
ANION GAP SERPL CALCULATED.4IONS-SCNC: 8 MMOL/L (CALC) (ref 7–17)
AST SERPL-CCNC: 14 U/L (ref 10–30)
BILIRUB SERPL-MCNC: 0.4 MG/DL (ref 0.2–1.2)
BUN SERPL-MCNC: 10 MG/DL (ref 7–25)
CALCIUM SERPL-MCNC: 9.5 MG/DL (ref 8.6–10.2)
CHLORIDE SERPL-SCNC: 102 MMOL/L (ref 98–110)
CHOLEST SERPL-MCNC: 158 MG/DL
CHOLEST/HDLC SERPL: 3.2 (CALC)
CO2 SERPL-SCNC: 27 MMOL/L (ref 20–32)
CREAT SERPL-MCNC: 0.55 MG/DL (ref 0.5–0.97)
EGFRCR SERPLBLD CKD-EPI 2021: 120 ML/MIN/1.73M2
GLUCOSE SERPL-MCNC: 94 MG/DL (ref 65–99)
HDLC SERPL-MCNC: 50 MG/DL
LDLC SERPL CALC-MCNC: 93 MG/DL (CALC)
NONHDLC SERPL-MCNC: 108 MG/DL (CALC)
POTASSIUM SERPL-SCNC: 4.3 MMOL/L (ref 3.5–5.3)
PROT SERPL-MCNC: 6.8 G/DL (ref 6.1–8.1)
SODIUM SERPL-SCNC: 137 MMOL/L (ref 135–146)
TRIGL SERPL-MCNC: 60 MG/DL

## 2025-05-30 ENCOUNTER — TELEPHONE (OUTPATIENT)
Dept: OTOLARYNGOLOGY | Facility: HOSPITAL | Age: 40
End: 2025-05-30
Payer: COMMERCIAL

## 2025-05-30 NOTE — TELEPHONE ENCOUNTER
Patient would like to cancel surgery at this time. She has young children and cannot get help at home during postop phase with lifting restrictions. Will follow up in 6 months to discuss further.

## 2025-06-10 ENCOUNTER — TELEPHONE (OUTPATIENT)
Dept: OBSTETRICS AND GYNECOLOGY | Facility: CLINIC | Age: 40
End: 2025-06-10
Payer: COMMERCIAL

## 2025-06-11 DIAGNOSIS — Z30.011 ENCOUNTER FOR INITIAL PRESCRIPTION OF CONTRACEPTIVE PILLS: ICD-10-CM

## 2025-06-11 RX ORDER — NORETHINDRONE 0.35 MG/1
1 TABLET ORAL DAILY
Qty: 84 TABLET | Refills: 3 | Status: SHIPPED | OUTPATIENT
Start: 2025-06-11 | End: 2026-06-11

## 2025-06-11 NOTE — TELEPHONE ENCOUNTER
Patient verified name and date of birth at start of call and was made aware that a refill order for her birthcontrol was sent to Dr. Parrish and once she signs it will go to her [facility] pharmacy on [address]. Patient was appreciative, verbalized understanding and had no further questions, comments or concerns at this time.    DOUGLAS Rodney

## 2025-06-23 DIAGNOSIS — H72.01 CENTRAL PERFORATION OF TYMPANIC MEMBRANE OF RIGHT EAR: ICD-10-CM

## 2025-06-23 DIAGNOSIS — Z01.818 PREOPERATIVE CLEARANCE: ICD-10-CM

## 2025-06-26 ENCOUNTER — APPOINTMENT (OUTPATIENT)
Dept: OBSTETRICS AND GYNECOLOGY | Facility: CLINIC | Age: 40
End: 2025-06-26
Payer: COMMERCIAL

## 2025-06-27 ENCOUNTER — APPOINTMENT (OUTPATIENT)
Dept: OBSTETRICS AND GYNECOLOGY | Facility: CLINIC | Age: 40
End: 2025-06-27
Payer: COMMERCIAL

## 2025-07-28 ENCOUNTER — APPOINTMENT (OUTPATIENT)
Dept: OTOLARYNGOLOGY | Facility: CLINIC | Age: 40
End: 2025-07-28
Payer: COMMERCIAL

## 2025-08-04 ENCOUNTER — APPOINTMENT (OUTPATIENT)
Dept: OTOLARYNGOLOGY | Facility: CLINIC | Age: 40
End: 2025-08-04
Payer: COMMERCIAL

## 2025-09-02 ENCOUNTER — APPOINTMENT (OUTPATIENT)
Dept: OBSTETRICS AND GYNECOLOGY | Facility: HOSPITAL | Age: 40
End: 2025-09-02
Payer: COMMERCIAL

## 2025-09-02 SDOH — ECONOMIC STABILITY: FOOD INSECURITY: WITHIN THE PAST 12 MONTHS, THE FOOD YOU BOUGHT JUST DIDN'T LAST AND YOU DIDN'T HAVE MONEY TO GET MORE.: NEVER TRUE

## 2025-09-02 SDOH — HEALTH STABILITY: PHYSICAL HEALTH: ON AVERAGE, HOW MANY MINUTES DO YOU ENGAGE IN EXERCISE AT THIS LEVEL?: 30 MIN

## 2025-09-02 SDOH — HEALTH STABILITY: PHYSICAL HEALTH: ON AVERAGE, HOW MANY DAYS PER WEEK DO YOU ENGAGE IN MODERATE TO STRENUOUS EXERCISE (LIKE A BRISK WALK)?: 7 DAYS

## 2025-09-02 SDOH — ECONOMIC STABILITY: FOOD INSECURITY: WITHIN THE PAST 12 MONTHS, YOU WORRIED THAT YOUR FOOD WOULD RUN OUT BEFORE YOU GOT MONEY TO BUY MORE.: NEVER TRUE

## 2025-09-02 ASSESSMENT — LIFESTYLE VARIABLES
HOW OFTEN DO YOU HAVE SIX OR MORE DRINKS ON ONE OCCASION: NEVER
HOW MANY STANDARD DRINKS CONTAINING ALCOHOL DO YOU HAVE ON A TYPICAL DAY: 1 OR 2
HOW OFTEN DO YOU HAVE A DRINK CONTAINING ALCOHOL: MONTHLY OR LESS
SKIP TO QUESTIONS 9-10: 1
AUDIT-C TOTAL SCORE: 1

## 2025-09-02 ASSESSMENT — ENCOUNTER SYMPTOMS
ENDOCRINE NEGATIVE: 0
CONSTITUTIONAL NEGATIVE: 0
EYES NEGATIVE: 0
CARDIOVASCULAR NEGATIVE: 0
HEMATOLOGIC/LYMPHATIC NEGATIVE: 0
ALLERGIC/IMMUNOLOGIC NEGATIVE: 0
RESPIRATORY NEGATIVE: 0
MUSCULOSKELETAL NEGATIVE: 0
GASTROINTESTINAL NEGATIVE: 1
NEUROLOGICAL NEGATIVE: 0
PSYCHIATRIC NEGATIVE: 0

## 2025-09-02 ASSESSMENT — PATIENT HEALTH QUESTIONNAIRE - PHQ9
2. FEELING DOWN, DEPRESSED OR HOPELESS: NOT AT ALL
1. LITTLE INTEREST OR PLEASURE IN DOING THINGS: NOT AT ALL
SUM OF ALL RESPONSES TO PHQ9 QUESTIONS 1 & 2: 0

## 2025-09-02 ASSESSMENT — PAIN SCALES - GENERAL: PAINLEVEL_OUTOF10: 0-NO PAIN

## 2025-09-02 ASSESSMENT — SOCIAL DETERMINANTS OF HEALTH (SDOH): HOW HARD IS IT FOR YOU TO PAY FOR THE VERY BASICS LIKE FOOD, HOUSING, MEDICAL CARE, AND HEATING?: NOT HARD AT ALL

## 2025-11-10 ENCOUNTER — APPOINTMENT (OUTPATIENT)
Dept: OTOLARYNGOLOGY | Facility: CLINIC | Age: 40
End: 2025-11-10
Payer: COMMERCIAL

## (undated) DEVICE — SUTURE, VICRYL, 0, 27 IN, UR-6, VIOLET

## (undated) DEVICE — Device

## (undated) DEVICE — TUBE SET, PNEUMOLAR HEATED, SMOKE EVACU, HIGH-FLOW

## (undated) DEVICE — ADHESIVE, SKIN, DERMABOND ADVANCED, 15CM, PEN-STYLE

## (undated) DEVICE — CATHETER, CHOLANGIOGRAM, W/STOPCOCK, 3 WAY, 18 G X 7 IN

## (undated) DEVICE — GLOVE, SURGICAL, PROTEXIS PI ORTHO, 7.0, PF, LF

## (undated) DEVICE — CANNULA, KII ADVANCED FIXATION, 5X100MM W/SEAL

## (undated) DEVICE — SUTURE, MONOCRYL, 4-0, 18 IN, PS2, UNDYED

## (undated) DEVICE — SHEAR, W/UNIPOLAR CAUTERY, ENDOSHEAR, 5 MM

## (undated) DEVICE — PUMP, STRYKERFLOW 2 & HANDPIECE W/10FT. IRRIGATION TUBING

## (undated) DEVICE — CORD, MONOPOLAR HIGH FREQUENCY, 8MM PLUG, 300CM

## (undated) DEVICE — APPLIER, 5MM ENDOSCOPIC, HEM-O-LOCK, W/15 ML CLIPS

## (undated) DEVICE — HOLSTER, JET SAFETY

## (undated) DEVICE — CLIP, ENDO, CLINCH II, W/RATCHET, ON/OFF, CLINCH II, 5 MM

## (undated) DEVICE — SCOPE WARMER, LAPAROSCOPE, BAG ONLY, LF

## (undated) DEVICE — TOWEL, SURGICAL, NEURO, O/R, 16 X 26, BLUE, STERILE

## (undated) DEVICE — RETRIEVAL SYSTEM, MONARCH, 10MM DISP ENDOSCOPIC

## (undated) DEVICE — TROCAR SYSTEM, BALLOON, KII GELPORT, 12 X 100MM